# Patient Record
Sex: FEMALE | Race: WHITE | Employment: FULL TIME | URBAN - METROPOLITAN AREA
[De-identification: names, ages, dates, MRNs, and addresses within clinical notes are randomized per-mention and may not be internally consistent; named-entity substitution may affect disease eponyms.]

---

## 2020-05-27 ENCOUNTER — HOSPITAL ENCOUNTER (OUTPATIENT)
Age: 50
Discharge: HOME | End: 2020-05-27
Payer: COMMERCIAL

## 2020-05-27 LAB
BASOPHILS ABSOLUTE: 0 K/UL (ref 0–0.2)
BASOPHILS RELATIVE PERCENT: 0.6 %
EOSINOPHILS ABSOLUTE: 0.4 K/UL (ref 0–0.6)
EOSINOPHILS RELATIVE PERCENT: 9.6 %
HCT VFR BLD CALC: 43.2 % (ref 36–48)
HEMOGLOBIN: 14.8 G/DL (ref 12–16)
LYMPHOCYTES ABSOLUTE: 1.3 K/UL (ref 1–5.1)
LYMPHOCYTES RELATIVE PERCENT: 27.4 %
MCH RBC QN AUTO: 30.7 PG (ref 26–34)
MCHC RBC AUTO-ENTMCNC: 34.2 G/DL (ref 31–36)
MCV RBC AUTO: 89.8 FL (ref 80–100)
MONOCYTES ABSOLUTE: 0.3 K/UL (ref 0–1.3)
MONOCYTES RELATIVE PERCENT: 6.7 %
NEUTROPHILS ABSOLUTE: 2.6 K/UL (ref 1.7–7.7)
NEUTROPHILS RELATIVE PERCENT: 55.7 %
PDW BLD-RTO: 13.5 % (ref 12.4–15.4)
PLATELET # BLD: 307 K/UL (ref 135–450)
PMV BLD AUTO: 8.9 FL (ref 5–10.5)
RBC # BLD: 4.81 M/UL (ref 4–5.2)
WBC # BLD: 4.7 K/UL (ref 4–11)

## 2020-05-27 PROCEDURE — 85025 COMPLETE CBC W/AUTO DIFF WBC: CPT

## 2020-05-27 PROCEDURE — 82785 ASSAY OF IGE: CPT

## 2020-05-27 PROCEDURE — 36415 COLL VENOUS BLD VENIPUNCTURE: CPT

## 2020-05-29 LAB — IGE: 442 KU/L

## 2023-11-13 ENCOUNTER — HOSPITAL ENCOUNTER (OUTPATIENT)
Age: 53
Setting detail: SPECIMEN
Discharge: HOME | End: 2023-11-13

## 2023-11-27 ENCOUNTER — HOSPITAL ENCOUNTER (OUTPATIENT)
Dept: MRI IMAGING | Age: 53
Discharge: HOME | End: 2023-11-27
Payer: COMMERCIAL

## 2023-11-27 DIAGNOSIS — R92.8 ABNORMAL MAMMOGRAM: ICD-10-CM

## 2023-11-27 PROCEDURE — 6360000004 HC RX CONTRAST MEDICATION: Performed by: SURGERY

## 2023-11-27 PROCEDURE — C8908 MRI W/O FOL W/CONT, BREAST,: HCPCS

## 2023-11-27 PROCEDURE — A9579 GAD-BASE MR CONTRAST NOS,1ML: HCPCS | Performed by: SURGERY

## 2023-11-27 RX ADMIN — GADOTERIDOL 20 ML: 279.3 INJECTION, SOLUTION INTRAVENOUS at 13:38

## 2023-12-01 ENCOUNTER — HOSPITAL ENCOUNTER (OUTPATIENT)
Dept: WOMENS IMAGING | Age: 53
Discharge: HOME | End: 2023-12-01
Payer: COMMERCIAL

## 2023-12-01 DIAGNOSIS — R92.8 ABNORMAL MAMMOGRAM: ICD-10-CM

## 2023-12-01 DIAGNOSIS — R92.8 ABNORMAL FINDING ON BREAST IMAGING: ICD-10-CM

## 2023-12-01 PROCEDURE — 77065 DX MAMMO INCL CAD UNI: CPT

## 2023-12-01 PROCEDURE — 76642 ULTRASOUND BREAST LIMITED: CPT

## 2023-12-01 PROCEDURE — 2709999900 US BREAST BIOPSY W LOC DEVICE 1ST LESION LEFT

## 2023-12-05 ENCOUNTER — TELEPHONE (OUTPATIENT)
Dept: WOMENS IMAGING | Age: 53
End: 2023-12-05

## 2023-12-05 NOTE — TELEPHONE ENCOUNTER
Pathology for breast biopsy complete, radiologist confirms concordance. Reviewed breast biopsy results with patient and answered all questions. The patient is following up with her breast surgeon, Dr. Eyal Sales. Breast Navigator will remain available for any questions. Pt verbalized understanding.       Elise Hager RN

## 2023-12-06 ENCOUNTER — OFFICE VISIT (OUTPATIENT)
Dept: SURGERY | Age: 53
End: 2023-12-06
Payer: COMMERCIAL

## 2023-12-06 VITALS
BODY MASS INDEX: 36.14 KG/M2 | RESPIRATION RATE: 18 BRPM | HEART RATE: 110 BPM | DIASTOLIC BLOOD PRESSURE: 83 MMHG | TEMPERATURE: 98.1 F | WEIGHT: 204 LBS | OXYGEN SATURATION: 98 % | SYSTOLIC BLOOD PRESSURE: 148 MMHG

## 2023-12-06 DIAGNOSIS — C50.912 MALIGNANT NEOPLASM OF LEFT FEMALE BREAST, UNSPECIFIED ESTROGEN RECEPTOR STATUS, UNSPECIFIED SITE OF BREAST (HCC): Primary | ICD-10-CM

## 2023-12-06 DIAGNOSIS — Z72.0 NICOTINE ABUSE: ICD-10-CM

## 2023-12-06 PROCEDURE — 99205 OFFICE O/P NEW HI 60 MIN: CPT | Performed by: SURGERY

## 2023-12-06 RX ORDER — NABUMETONE 750 MG/1
750 TABLET, FILM COATED ORAL 2 TIMES DAILY
COMMUNITY

## 2023-12-29 ENCOUNTER — TELEPHONE (OUTPATIENT)
Dept: SURGERY | Age: 53
End: 2023-12-29

## 2023-12-29 NOTE — TELEPHONE ENCOUNTER
The patient was in the office to see Dr. Weinberg 12-6-2023.     PLAN: Would benefit from immediate oncoplastic reduction. Will obtain nicotine test in 4 weeks and if satisfactory, will schedule with Dr. Law.     The patient will take her nicotine test 1-8-2024.    Please send surgery letter.     I will route to MD.

## 2024-01-04 NOTE — TELEPHONE ENCOUNTER
I received a surgery letter from Dr. Weinberg.    I spoke with Deidre RENNER at [facility] ([phone]) to see if CPT Code 47153, 50934 or 38215 require pre certification. CPT Code 15951 and 23011 do require pre certification and for CPT Code 00980 pre determination is recommended, which I started during our call. I will fax clinicals to [phone]. I will scan the information that I faxed and the fax success into Epic under the media tab.     Pending Case # [id number]    Date Range 1- to 4-    Time HELD 1- 7:30 am.     The patient will have her nicotine test on 1-8-2024.    Please correct the surgery letter to correct the procedure name.       I will route to MD.

## 2024-01-05 NOTE — TELEPHONE ENCOUNTER
I received an updated surgery letter from Dr. Weinberg.     I spoke with Linda SANTIAGO at [place] ([phone]) to see if I can change the CPT Codes on  Pending Case # [id number]. The CPT Codes were changed to 02623 and 51286 with the original diagnosis code of C50.912.     Call Reference # [id number]    I will leave this phone note open.

## 2024-01-08 DIAGNOSIS — Z72.0 NICOTINE ABUSE: ICD-10-CM

## 2024-01-08 DIAGNOSIS — C50.912 MALIGNANT NEOPLASM OF LEFT FEMALE BREAST, UNSPECIFIED ESTROGEN RECEPTOR STATUS, UNSPECIFIED SITE OF BREAST (HCC): ICD-10-CM

## 2024-01-11 ENCOUNTER — HOSPITAL ENCOUNTER (OUTPATIENT)
Age: 54
Setting detail: SPECIMEN
Discharge: HOME | End: 2024-01-11

## 2024-01-11 LAB
Lab: NORMAL
REPORT: NORMAL
THIS TEST SENT TO: NORMAL

## 2024-01-12 ENCOUNTER — TELEPHONE (OUTPATIENT)
Dept: SURGERY | Age: 54
End: 2024-01-12

## 2024-01-12 LAB
COTININE SERPL-MCNC: <5 NG/ML
NICOTINE SERPL-MCNC: <5 NG/ML

## 2024-01-12 NOTE — TELEPHONE ENCOUNTER
I returned the patients call mentioned below. She is aware that as of today I have not received her nicotine result and that it normally takes 4-5 days for us to receive the result.     The patient had her H&P, lab work and EKG at [facility]. I can see the information in Care Everywhere.     I will close this phone note.

## 2024-01-12 NOTE — TELEPHONE ENCOUNTER
Patient called wanting to know if her nicotine lab results had been received yet, informed pt I did not see them in her chart and that clinical team would call her when they receive them. She is requesting call back wanting to know why they are not yet received, she is nervous because she has surgery scheduled next week

## 2024-01-15 NOTE — PROGRESS NOTES
[facility] PRE-SURGICAL TESTING INSTRUCTIONS                      PRIOR TO PROCEDURE DATE:    1. PLEASE FOLLOW ANY INSTRUCTIONS GIVEN TO YOU PER YOUR SURGEON.      2. Arrange for someone to drive you home and be with you for the first 24 hours after discharge for your safety after your procedure for which you received sedation. Ensure it is someone we can share information with regarding your discharge.     NOTE: At this time ONLY 2 ADULTS may accompany you   One person ENCOURAGED to stay at hospital entire time if outpatient surgery      3. You must contact your surgeon for instructions IF:  You are taking any blood thinners, aspirin, anti-inflammatory or vitamins.  There is a change in your physical condition such as a cold, fever, rash, cuts, sores, or any other infection, especially near your surgical site.    4. Do not drink alcohol the day before or day of your procedure.  Do not use any recreational marijuana at least 24 hours or street drugs (heroin, cocaine) at minimum 5 days prior to your procedure.     5. A Pre-Surgical History and Physical MUST be completed WITHIN 30 DAYS OR LESS prior to your procedure.by your Physician or an Urgent Care        THE DAY OF YOUR PROCEDURE:  1.  Follow instructions for ARRIVAL TIME as DIRECTED BY YOUR SURGEON.     2. Enter the MAIN entrance from [facility] and follow the signs to the free Parking Garage or  Parking (offered free of charge 7 am-5pm).      3. Enter the Main Entrance of the hospital (do not enter from the lower level of the parking garage). Upon entrance, check in with the  at the surgical information desk on your LEFT.   Bring your insurance card and photo ID to register      4. DO NOT EAT ANYTHING 8 hours prior to arrival for surgery.  You may have up to 8 ounces of water 4 hours prior to your arrival for surgery.   NOTE: ALL Gastric, Bariatric & Bowel surgery patients - you MUST follow your surgeon's instructions regarding

## 2024-01-15 NOTE — TELEPHONE ENCOUNTER
Per Dr. Weinberg : Negative nicotine - good to schedule.     Thanks!  NK    I spoke with the patient at the home number listed. The patient is now scheduled for surgery with  on 1-.     The patient had her H&P 1-5-2024 with [facility].     The patient is scheduled for her post op appointment     I will submit the surgery letter to [facility] today.     I will mail the surgery information and instructions to the patient today.    I will leave this phone note open until I hear back from Sha.

## 2024-01-16 ENCOUNTER — HOSPITAL ENCOUNTER (OUTPATIENT)
Dept: MAMMOGRAPHY | Age: 54
Discharge: HOME | End: 2024-01-16
Payer: COMMERCIAL

## 2024-01-16 ENCOUNTER — HOSPITAL ENCOUNTER (OUTPATIENT)
Dept: ULTRASOUND IMAGING | Age: 54
Discharge: HOME | End: 2024-01-16
Payer: COMMERCIAL

## 2024-01-16 DIAGNOSIS — R92.8 ABNORMAL MAMMOGRAM: ICD-10-CM

## 2024-01-16 PROCEDURE — 2709999900 US PLACE BREAST LOC DEVICE EACH ADDL LEFT

## 2024-01-16 PROCEDURE — 19285 PERQ DEV BREAST 1ST US IMAG: CPT

## 2024-01-16 PROCEDURE — 77065 DX MAMMO INCL CAD UNI: CPT

## 2024-01-17 ENCOUNTER — OFFICE VISIT (OUTPATIENT)
Dept: SURGERY | Age: 54
End: 2024-01-17
Payer: COMMERCIAL

## 2024-01-17 DIAGNOSIS — C50.912 MALIGNANT NEOPLASM OF LEFT FEMALE BREAST, UNSPECIFIED ESTROGEN RECEPTOR STATUS, UNSPECIFIED SITE OF BREAST (HCC): Primary | ICD-10-CM

## 2024-01-17 PROCEDURE — 99213 OFFICE O/P EST LOW 20 MIN: CPT

## 2024-01-17 NOTE — PROGRESS NOTES
[facility]    CC: Breast CA     Referring physician: Janna Law MD    HPI: This is a 53 y.o. female with a PMHx as delineated below who presents in consultation for breast reconstruction. She was found to have left breast cancer and desires to proceed with breast conservation therapy (two separate areas) with immediate oncoplastic reconstruction.  Plastic surgery was consulted for evaluation and treatment.The specifics of her breast cancer workup / treatment is as follows:     The patient presents today for education prior to surgical intervention. We have reviewed the surgical plan. The patient is scheduled for left oncoplastic breast reconstruction with matching right reduction mammaplasty. She will need adjuvant radiation as well. Patient did obtain nicotine testing which was negative. She denies any changes to her history since her last evaluation with our office.     Diagnosis: invasive lobular  Mo/Yr Diagnosed:   Breast Involved:Left  Stage: 1A  Addison status: Negative  ER: Positive DE: Positive HER2: Negative  Radiation: YES (WILL NEED ADJUVANT RADIATION)    Chemo/Meds: None  Pregnancy/Miscarriages:     PMHx:   Past Medical History:   Diagnosis Date    ADHD (attention deficit hyperactivity disorder)     Anxiety     Arthritis     Asthma     Depression     Lumbago      PSHx:   Past Surgical History:   Procedure Laterality Date     SECTION      SINUS SURGERY      US BREAST BIOPSY W LOC DEVICE 1ST LESION LEFT Left 2023    US BREAST BIOPSY W LOC DEVICE 1ST LESION LEFT 2023 Rob Mendoza MD [facility]    US GUIDED NEEDLE LOC BREAST ADDL LEFT Left 2024    US GUIDED NEEDLE LOC BREAST ADDL LEFT 2024 [facility]    US GUIDED NEEDLE LOC OF LEFT BREAST Left 2024    US GUIDED NEEDLE LOC OF LEFT BREAST 2024 [facility]     Allergies:   Allergies   Allergen Reactions    Gabapentin      FULL BODY SWELLING

## 2024-01-18 ENCOUNTER — ANESTHESIA EVENT (OUTPATIENT)
Dept: OPERATING ROOM | Age: 54
End: 2024-01-18
Payer: COMMERCIAL

## 2024-01-18 NOTE — TELEPHONE ENCOUNTER
I received a fax from [employer] dated 1-. I will scan the fax into Epic under the media tab.     APPROVED  Reference Number [id number]  CPT Code 45149, 56480, 29256  Date Range 1- to 1-    I will close this phone note.

## 2024-01-18 NOTE — TELEPHONE ENCOUNTER
I spoke with Mateus VELA at [facility] ([phone]) to follow up on Pending Case # [id number]. PENDING. I explained that the case is scheduled for tomorrow (1-) and that the case will be canceled without an APPROVAL today. The rep will send an email to the  to ask for a response ASAP.     Call Reference #  [id number]     I will leave this phone note open.

## 2024-01-19 ENCOUNTER — ANESTHESIA (OUTPATIENT)
Dept: OPERATING ROOM | Age: 54
End: 2024-01-19
Payer: COMMERCIAL

## 2024-01-19 ENCOUNTER — APPOINTMENT (OUTPATIENT)
Dept: MAMMOGRAPHY | Age: 54
End: 2024-01-19
Attending: SURGERY
Payer: COMMERCIAL

## 2024-01-19 ENCOUNTER — HOSPITAL ENCOUNTER (OUTPATIENT)
Age: 54
Setting detail: OUTPATIENT SURGERY
Discharge: HOME | End: 2024-01-19
Attending: SURGERY | Admitting: SURGERY
Payer: COMMERCIAL

## 2024-01-19 ENCOUNTER — APPOINTMENT (OUTPATIENT)
Dept: NUCLEAR MEDICINE | Age: 54
End: 2024-01-19
Payer: COMMERCIAL

## 2024-01-19 VITALS
HEART RATE: 88 BPM | TEMPERATURE: 97.6 F | SYSTOLIC BLOOD PRESSURE: 129 MMHG | OXYGEN SATURATION: 95 % | RESPIRATION RATE: 16 BRPM | HEIGHT: 63 IN | DIASTOLIC BLOOD PRESSURE: 72 MMHG | WEIGHT: 205.8 LBS | BODY MASS INDEX: 36.46 KG/M2

## 2024-01-19 DIAGNOSIS — R93.89 ABNORMAL FINDING ON IMAGING: Primary | ICD-10-CM

## 2024-01-19 DIAGNOSIS — G89.18 POST-OP PAIN: Primary | ICD-10-CM

## 2024-01-19 DIAGNOSIS — G89.18 ACUTE POST-OPERATIVE PAIN: ICD-10-CM

## 2024-01-19 DIAGNOSIS — Z85.3 HISTORY OF LEFT BREAST CANCER: ICD-10-CM

## 2024-01-19 PROCEDURE — 2580000003 HC RX 258: Performed by: ANESTHESIOLOGY

## 2024-01-19 PROCEDURE — 6360000002 HC RX W HCPCS: Performed by: NURSE ANESTHETIST, CERTIFIED REGISTERED

## 2024-01-19 PROCEDURE — 3700000000 HC ANESTHESIA ATTENDED CARE: Performed by: SURGERY

## 2024-01-19 PROCEDURE — A4217 STERILE WATER/SALINE, 500 ML: HCPCS | Performed by: SURGERY

## 2024-01-19 PROCEDURE — 88342 IMHCHEM/IMCYTCHM 1ST ANTB: CPT

## 2024-01-19 PROCEDURE — 7100000011 HC PHASE II RECOVERY - ADDTL 15 MIN: Performed by: SURGERY

## 2024-01-19 PROCEDURE — 2709999900 HC NON-CHARGEABLE SUPPLY: Performed by: SURGERY

## 2024-01-19 PROCEDURE — C1729 CATH, DRAINAGE: HCPCS | Performed by: SURGERY

## 2024-01-19 PROCEDURE — 97605 NEG PRS WND THER DME<=50SQCM: CPT | Performed by: SURGERY

## 2024-01-19 PROCEDURE — 6370000000 HC RX 637 (ALT 250 FOR IP): Performed by: ANESTHESIOLOGY

## 2024-01-19 PROCEDURE — 2580000003 HC RX 258: Performed by: SURGERY

## 2024-01-19 PROCEDURE — 2500000003 HC RX 250 WO HCPCS: Performed by: NURSE ANESTHETIST, CERTIFIED REGISTERED

## 2024-01-19 PROCEDURE — 76098 X-RAY EXAM SURGICAL SPECIMEN: CPT

## 2024-01-19 PROCEDURE — 14301 TIS TRNFR ANY 30.1-60 SQ CM: CPT | Performed by: SURGERY

## 2024-01-19 PROCEDURE — 3600000014 HC SURGERY LEVEL 4 ADDTL 15MIN: Performed by: SURGERY

## 2024-01-19 PROCEDURE — 19318 BREAST REDUCTION: CPT | Performed by: SURGERY

## 2024-01-19 PROCEDURE — C1713 ANCHOR/SCREW BN/BN,TIS/BN: HCPCS | Performed by: SURGERY

## 2024-01-19 PROCEDURE — 3430000000 HC RX DIAGNOSTIC RADIOPHARMACEUTICAL: Performed by: SURGERY

## 2024-01-19 PROCEDURE — 3700000001 HC ADD 15 MINUTES (ANESTHESIA): Performed by: SURGERY

## 2024-01-19 PROCEDURE — 88305 TISSUE EXAM BY PATHOLOGIST: CPT

## 2024-01-19 PROCEDURE — 6360000002 HC RX W HCPCS: Performed by: SURGERY

## 2024-01-19 PROCEDURE — 6360000002 HC RX W HCPCS: Performed by: ANESTHESIOLOGY

## 2024-01-19 PROCEDURE — 3600000004 HC SURGERY LEVEL 4 BASE: Performed by: SURGERY

## 2024-01-19 PROCEDURE — 2720000010 HC SURG SUPPLY STERILE: Performed by: SURGERY

## 2024-01-19 PROCEDURE — 14302 TIS TRNFR ADDL 30 SQ CM: CPT | Performed by: SURGERY

## 2024-01-19 PROCEDURE — 7100000010 HC PHASE II RECOVERY - FIRST 15 MIN: Performed by: SURGERY

## 2024-01-19 PROCEDURE — A9520 TC99 TILMANOCEPT DIAG 0.5MCI: HCPCS | Performed by: SURGERY

## 2024-01-19 PROCEDURE — 78195 LYMPH SYSTEM IMAGING: CPT

## 2024-01-19 PROCEDURE — 6370000000 HC RX 637 (ALT 250 FOR IP): Performed by: SURGERY

## 2024-01-19 PROCEDURE — 7100000000 HC PACU RECOVERY - FIRST 15 MIN: Performed by: SURGERY

## 2024-01-19 PROCEDURE — 88307 TISSUE EXAM BY PATHOLOGIST: CPT

## 2024-01-19 PROCEDURE — 7100000001 HC PACU RECOVERY - ADDTL 15 MIN: Performed by: SURGERY

## 2024-01-19 PROCEDURE — 2580000003 HC RX 258: Performed by: NURSE ANESTHETIST, CERTIFIED REGISTERED

## 2024-01-19 RX ORDER — DIAZEPAM 5 MG/1
5 TABLET ORAL EVERY 12 HOURS PRN
Qty: 20 TABLET | Refills: 0 | Status: SHIPPED | OUTPATIENT
Start: 2024-01-19 | End: 2024-01-29

## 2024-01-19 RX ORDER — FENTANYL CITRATE 50 UG/ML
25 INJECTION, SOLUTION INTRAMUSCULAR; INTRAVENOUS EVERY 5 MIN PRN
Status: COMPLETED | OUTPATIENT
Start: 2024-01-19 | End: 2024-01-19

## 2024-01-19 RX ORDER — ONDANSETRON 2 MG/ML
INJECTION INTRAMUSCULAR; INTRAVENOUS PRN
Status: DISCONTINUED | OUTPATIENT
Start: 2024-01-19 | End: 2024-01-19 | Stop reason: SDUPTHER

## 2024-01-19 RX ORDER — PHENYLEPHRINE HYDROCHLORIDE 10 MG/ML
INJECTION INTRAVENOUS PRN
Status: DISCONTINUED | OUTPATIENT
Start: 2024-01-19 | End: 2024-01-19 | Stop reason: SDUPTHER

## 2024-01-19 RX ORDER — APREPITANT 40 MG/1
40 CAPSULE ORAL ONCE
Status: COMPLETED | OUTPATIENT
Start: 2024-01-19 | End: 2024-01-19

## 2024-01-19 RX ORDER — CEFAZOLIN SODIUM 1 G/3ML
INJECTION, POWDER, FOR SOLUTION INTRAMUSCULAR; INTRAVENOUS PRN
Status: DISCONTINUED | OUTPATIENT
Start: 2024-01-19 | End: 2024-01-19 | Stop reason: SDUPTHER

## 2024-01-19 RX ORDER — HYDROMORPHONE HYDROCHLORIDE 2 MG/ML
INJECTION, SOLUTION INTRAMUSCULAR; INTRAVENOUS; SUBCUTANEOUS PRN
Status: DISCONTINUED | OUTPATIENT
Start: 2024-01-19 | End: 2024-01-19 | Stop reason: SDUPTHER

## 2024-01-19 RX ORDER — KETAMINE HCL IN NACL, ISO-OSM 20 MG/2 ML
SYRINGE (ML) INJECTION PRN
Status: DISCONTINUED | OUTPATIENT
Start: 2024-01-19 | End: 2024-01-19 | Stop reason: SDUPTHER

## 2024-01-19 RX ORDER — MAGNESIUM HYDROXIDE 1200 MG/15ML
LIQUID ORAL CONTINUOUS PRN
Status: COMPLETED | OUTPATIENT
Start: 2024-01-19 | End: 2024-01-19

## 2024-01-19 RX ORDER — DIPHENHYDRAMINE HYDROCHLORIDE 50 MG/ML
12.5 INJECTION INTRAMUSCULAR; INTRAVENOUS
Status: DISCONTINUED | OUTPATIENT
Start: 2024-01-19 | End: 2024-01-19 | Stop reason: HOSPADM

## 2024-01-19 RX ORDER — LABETALOL HYDROCHLORIDE 5 MG/ML
10 INJECTION, SOLUTION INTRAVENOUS
Status: DISCONTINUED | OUTPATIENT
Start: 2024-01-19 | End: 2024-01-19 | Stop reason: HOSPADM

## 2024-01-19 RX ORDER — LORAZEPAM 2 MG/ML
0.5 INJECTION INTRAMUSCULAR
Status: DISCONTINUED | OUTPATIENT
Start: 2024-01-19 | End: 2024-01-19 | Stop reason: HOSPADM

## 2024-01-19 RX ORDER — DOCUSATE SODIUM 100 MG/1
100 CAPSULE, LIQUID FILLED ORAL 2 TIMES DAILY
Qty: 30 CAPSULE | Refills: 0 | Status: SHIPPED | OUTPATIENT
Start: 2024-01-19 | End: 2024-02-03

## 2024-01-19 RX ORDER — ACETAMINOPHEN 325 MG/1
650 TABLET ORAL
Status: DISCONTINUED | OUTPATIENT
Start: 2024-01-19 | End: 2024-01-19 | Stop reason: HOSPADM

## 2024-01-19 RX ORDER — HYDROCODONE BITARTRATE AND ACETAMINOPHEN 5; 325 MG/1; MG/1
1 TABLET ORAL
Status: COMPLETED | OUTPATIENT
Start: 2024-01-19 | End: 2024-01-19

## 2024-01-19 RX ORDER — FIBRINOGEN HUMAN, HUMAN THROMBIN 90; 500 [IU]/ML; [IU]/ML
SOLUTION TOPICAL PRN
Status: DISCONTINUED | OUTPATIENT
Start: 2024-01-19 | End: 2024-01-19 | Stop reason: ALTCHOICE

## 2024-01-19 RX ORDER — HYDROCODONE BITARTRATE AND ACETAMINOPHEN 5; 325 MG/1; MG/1
1 TABLET ORAL EVERY 6 HOURS PRN
Qty: 28 TABLET | Refills: 0 | Status: SHIPPED | OUTPATIENT
Start: 2024-01-19 | End: 2024-01-26

## 2024-01-19 RX ORDER — FENTANYL CITRATE 50 UG/ML
50 INJECTION, SOLUTION INTRAMUSCULAR; INTRAVENOUS EVERY 5 MIN PRN
Status: DISCONTINUED | OUTPATIENT
Start: 2024-01-19 | End: 2024-01-19 | Stop reason: HOSPADM

## 2024-01-19 RX ORDER — ISOSULFAN BLUE 50 MG/5ML
INJECTION, SOLUTION SUBCUTANEOUS PRN
Status: DISCONTINUED | OUTPATIENT
Start: 2024-01-19 | End: 2024-01-19 | Stop reason: ALTCHOICE

## 2024-01-19 RX ORDER — MEPERIDINE HYDROCHLORIDE 25 MG/ML
12.5 INJECTION INTRAMUSCULAR; INTRAVENOUS; SUBCUTANEOUS EVERY 5 MIN PRN
Status: DISCONTINUED | OUTPATIENT
Start: 2024-01-19 | End: 2024-01-19 | Stop reason: HOSPADM

## 2024-01-19 RX ORDER — GLYCOPYRROLATE 0.2 MG/ML
INJECTION INTRAMUSCULAR; INTRAVENOUS PRN
Status: DISCONTINUED | OUTPATIENT
Start: 2024-01-19 | End: 2024-01-19 | Stop reason: SDUPTHER

## 2024-01-19 RX ORDER — CEPHALEXIN 500 MG/1
500 CAPSULE ORAL 4 TIMES DAILY
Qty: 40 CAPSULE | Refills: 0 | Status: SHIPPED | OUTPATIENT
Start: 2024-01-19 | End: 2024-01-29

## 2024-01-19 RX ORDER — LIDOCAINE HYDROCHLORIDE 20 MG/ML
INJECTION, SOLUTION INTRAVENOUS PRN
Status: DISCONTINUED | OUTPATIENT
Start: 2024-01-19 | End: 2024-01-19 | Stop reason: SDUPTHER

## 2024-01-19 RX ORDER — IPRATROPIUM BROMIDE AND ALBUTEROL SULFATE 2.5; .5 MG/3ML; MG/3ML
1 SOLUTION RESPIRATORY (INHALATION)
Status: DISCONTINUED | OUTPATIENT
Start: 2024-01-19 | End: 2024-01-19 | Stop reason: HOSPADM

## 2024-01-19 RX ORDER — SCOLOPAMINE TRANSDERMAL SYSTEM 1 MG/1
1 PATCH, EXTENDED RELEASE TRANSDERMAL ONCE
Status: DISCONTINUED | OUTPATIENT
Start: 2024-01-19 | End: 2024-01-19 | Stop reason: HOSPADM

## 2024-01-19 RX ORDER — SODIUM CHLORIDE, SODIUM LACTATE, POTASSIUM CHLORIDE, CALCIUM CHLORIDE 600; 310; 30; 20 MG/100ML; MG/100ML; MG/100ML; MG/100ML
INJECTION, SOLUTION INTRAVENOUS CONTINUOUS
Status: DISCONTINUED | OUTPATIENT
Start: 2024-01-19 | End: 2024-01-19 | Stop reason: HOSPADM

## 2024-01-19 RX ORDER — SODIUM CHLORIDE 0.9 % (FLUSH) 0.9 %
5-40 SYRINGE (ML) INJECTION PRN
Status: DISCONTINUED | OUTPATIENT
Start: 2024-01-19 | End: 2024-01-19 | Stop reason: HOSPADM

## 2024-01-19 RX ORDER — HYDROMORPHONE HYDROCHLORIDE 1 MG/ML
0.5 INJECTION, SOLUTION INTRAMUSCULAR; INTRAVENOUS; SUBCUTANEOUS EVERY 5 MIN PRN
Status: DISCONTINUED | OUTPATIENT
Start: 2024-01-19 | End: 2024-01-19 | Stop reason: HOSPADM

## 2024-01-19 RX ORDER — METHOCARBAMOL 100 MG/ML
INJECTION, SOLUTION INTRAMUSCULAR; INTRAVENOUS PRN
Status: DISCONTINUED | OUTPATIENT
Start: 2024-01-19 | End: 2024-01-19 | Stop reason: SDUPTHER

## 2024-01-19 RX ORDER — ONDANSETRON 2 MG/ML
4 INJECTION INTRAMUSCULAR; INTRAVENOUS
Status: DISCONTINUED | OUTPATIENT
Start: 2024-01-19 | End: 2024-01-19 | Stop reason: HOSPADM

## 2024-01-19 RX ORDER — PROCHLORPERAZINE EDISYLATE 5 MG/ML
5 INJECTION INTRAMUSCULAR; INTRAVENOUS
Status: DISCONTINUED | OUTPATIENT
Start: 2024-01-19 | End: 2024-01-19 | Stop reason: HOSPADM

## 2024-01-19 RX ORDER — ROCURONIUM BROMIDE 10 MG/ML
INJECTION, SOLUTION INTRAVENOUS PRN
Status: DISCONTINUED | OUTPATIENT
Start: 2024-01-19 | End: 2024-01-19 | Stop reason: SDUPTHER

## 2024-01-19 RX ORDER — FAMOTIDINE 10 MG/ML
INJECTION, SOLUTION INTRAVENOUS PRN
Status: DISCONTINUED | OUTPATIENT
Start: 2024-01-19 | End: 2024-01-19 | Stop reason: SDUPTHER

## 2024-01-19 RX ORDER — SODIUM CHLORIDE 9 MG/ML
INJECTION, SOLUTION INTRAVENOUS PRN
Status: DISCONTINUED | OUTPATIENT
Start: 2024-01-19 | End: 2024-01-19 | Stop reason: HOSPADM

## 2024-01-19 RX ORDER — PROPOFOL 10 MG/ML
INJECTION, EMULSION INTRAVENOUS PRN
Status: DISCONTINUED | OUTPATIENT
Start: 2024-01-19 | End: 2024-01-19 | Stop reason: SDUPTHER

## 2024-01-19 RX ORDER — SODIUM CHLORIDE 0.9 % (FLUSH) 0.9 %
5-40 SYRINGE (ML) INJECTION EVERY 12 HOURS SCHEDULED
Status: DISCONTINUED | OUTPATIENT
Start: 2024-01-19 | End: 2024-01-19 | Stop reason: HOSPADM

## 2024-01-19 RX ADMIN — SODIUM CHLORIDE, POTASSIUM CHLORIDE, SODIUM LACTATE AND CALCIUM CHLORIDE: 600; 310; 30; 20 INJECTION, SOLUTION INTRAVENOUS at 09:13

## 2024-01-19 RX ADMIN — HYDROCODONE BITARTRATE AND ACETAMINOPHEN 1 TABLET: 5; 325 TABLET ORAL at 13:17

## 2024-01-19 RX ADMIN — Medication 20 MG: at 07:34

## 2024-01-19 RX ADMIN — FENTANYL CITRATE 25 MCG: 50 INJECTION INTRAMUSCULAR; INTRAVENOUS at 11:21

## 2024-01-19 RX ADMIN — ROCURONIUM BROMIDE 100 MG: 10 INJECTION, SOLUTION INTRAVENOUS at 07:35

## 2024-01-19 RX ADMIN — HYDROMORPHONE HYDROCHLORIDE 0.5 MG: 1 INJECTION, SOLUTION INTRAMUSCULAR; INTRAVENOUS; SUBCUTANEOUS at 11:10

## 2024-01-19 RX ADMIN — GLYCOPYRROLATE 0.2 MG: 0.2 INJECTION INTRAMUSCULAR; INTRAVENOUS at 09:11

## 2024-01-19 RX ADMIN — HYDROMORPHONE HYDROCHLORIDE 2 MG: 2 INJECTION, SOLUTION INTRAMUSCULAR; INTRAVENOUS; SUBCUTANEOUS at 07:34

## 2024-01-19 RX ADMIN — FENTANYL CITRATE 25 MCG: 50 INJECTION INTRAMUSCULAR; INTRAVENOUS at 11:15

## 2024-01-19 RX ADMIN — METHOCARBAMOL 1000 MG: 100 INJECTION, SOLUTION INTRAMUSCULAR; INTRAVENOUS at 07:47

## 2024-01-19 RX ADMIN — SODIUM CHLORIDE, POTASSIUM CHLORIDE, SODIUM LACTATE AND CALCIUM CHLORIDE: 600; 310; 30; 20 INJECTION, SOLUTION INTRAVENOUS at 06:57

## 2024-01-19 RX ADMIN — SODIUM CHLORIDE, POTASSIUM CHLORIDE, SODIUM LACTATE AND CALCIUM CHLORIDE: 600; 310; 30; 20 INJECTION, SOLUTION INTRAVENOUS at 10:22

## 2024-01-19 RX ADMIN — PROPOFOL 200 MG: 10 INJECTION, EMULSION INTRAVENOUS at 07:34

## 2024-01-19 RX ADMIN — FAMOTIDINE 20 MG: 10 INJECTION, SOLUTION INTRAVENOUS at 07:32

## 2024-01-19 RX ADMIN — ROCURONIUM BROMIDE 50 MG: 10 INJECTION, SOLUTION INTRAVENOUS at 08:48

## 2024-01-19 RX ADMIN — FENTANYL CITRATE 25 MCG: 50 INJECTION INTRAMUSCULAR; INTRAVENOUS at 11:26

## 2024-01-19 RX ADMIN — APREPITANT 40 MG: 40 CAPSULE ORAL at 06:56

## 2024-01-19 RX ADMIN — LIDOCAINE HYDROCHLORIDE 100 MG: 20 INJECTION, SOLUTION INTRAVENOUS at 07:34

## 2024-01-19 RX ADMIN — FENTANYL CITRATE 50 MCG: 50 INJECTION INTRAMUSCULAR; INTRAVENOUS at 12:05

## 2024-01-19 RX ADMIN — SUGAMMADEX 200 MG: 100 INJECTION, SOLUTION INTRAVENOUS at 09:24

## 2024-01-19 RX ADMIN — CEFAZOLIN SODIUM 2 G: 1 POWDER, FOR SOLUTION INTRAMUSCULAR; INTRAVENOUS at 07:48

## 2024-01-19 RX ADMIN — ONDANSETRON 8 MG: 2 INJECTION INTRAMUSCULAR; INTRAVENOUS at 07:32

## 2024-01-19 RX ADMIN — TRANEXAMIC ACID 1000 MG: 100 INJECTION, SOLUTION INTRAVENOUS at 07:53

## 2024-01-19 RX ADMIN — TILMANOCEPT 803 MICRO CURIE: KIT at 08:22

## 2024-01-19 RX ADMIN — FENTANYL CITRATE 25 MCG: 50 INJECTION INTRAMUSCULAR; INTRAVENOUS at 11:31

## 2024-01-19 RX ADMIN — PHENYLEPHRINE HYDROCHLORIDE 50 MCG: 10 INJECTION INTRAVENOUS at 07:46

## 2024-01-19 RX ADMIN — HYDROMORPHONE HYDROCHLORIDE 0.5 MG: 1 INJECTION, SOLUTION INTRAMUSCULAR; INTRAVENOUS; SUBCUTANEOUS at 11:01

## 2024-01-19 ASSESSMENT — PAIN DESCRIPTION - FREQUENCY
FREQUENCY: CONTINUOUS

## 2024-01-19 ASSESSMENT — PAIN DESCRIPTION - PAIN TYPE
TYPE: SURGICAL PAIN

## 2024-01-19 ASSESSMENT — PAIN DESCRIPTION - ORIENTATION
ORIENTATION: RIGHT;LEFT
ORIENTATION: RIGHT;LEFT;MID
ORIENTATION: LEFT;RIGHT
ORIENTATION: RIGHT;LEFT
ORIENTATION: RIGHT;LEFT

## 2024-01-19 ASSESSMENT — PAIN SCALES - GENERAL
PAINLEVEL_OUTOF10: 5
PAINLEVEL_OUTOF10: 6
PAINLEVEL_OUTOF10: 7
PAINLEVEL_OUTOF10: 6
PAINLEVEL_OUTOF10: 8
PAINLEVEL_OUTOF10: 10

## 2024-01-19 ASSESSMENT — PAIN - FUNCTIONAL ASSESSMENT
PAIN_FUNCTIONAL_ASSESSMENT: PREVENTS OR INTERFERES SOME ACTIVE ACTIVITIES AND ADLS
PAIN_FUNCTIONAL_ASSESSMENT: PREVENTS OR INTERFERES SOME ACTIVE ACTIVITIES AND ADLS
PAIN_FUNCTIONAL_ASSESSMENT: 0-10
PAIN_FUNCTIONAL_ASSESSMENT: 0-10
PAIN_FUNCTIONAL_ASSESSMENT: PREVENTS OR INTERFERES SOME ACTIVE ACTIVITIES AND ADLS

## 2024-01-19 ASSESSMENT — PAIN DESCRIPTION - DESCRIPTORS
DESCRIPTORS: ACHING
DESCRIPTORS: BURNING
DESCRIPTORS: ACHING
DESCRIPTORS: BURNING
DESCRIPTORS: BURNING
DESCRIPTORS: ACHING
DESCRIPTORS: ACHING;DISCOMFORT

## 2024-01-19 ASSESSMENT — PAIN DESCRIPTION - ONSET
ONSET: ON-GOING

## 2024-01-19 ASSESSMENT — PAIN DESCRIPTION - LOCATION
LOCATION: BREAST
LOCATION: BREAST;CHEST
LOCATION: BREAST

## 2024-01-19 NOTE — OP NOTE
Operative Note    Rupinder Yao  YOB: 1970  MRN: [id number]    PREOPERATIVE DIAGNOSIS  left breast cancer     POSTOPERATIVE DIAGNOSIS  left breast cancer    PROCEDURE  1. Injection of blue dye to the left breast  2. left bracketed radiofrequency identification tag localized partial mastectomy  3. left sentinel lymph node biopsy    ANESTHESIA  General anesthesia.    SURGEON  Janna Law MD     ASSISTANT  Resident: Ld Mckeon DO    ESTIMATED BLOOD LOSS  less than 50 ml    COMPLICATIONS  None apparent by completion of procedure    SPECIMENS REMOVED  1. left breast tissue which was marked with a short stitch on the superior margin and a long stitch on the lateral margin, skin anterior-inferior  2. left breast tissue, new superior-lateral margin which was marked with a stitch on the new true margin  3. left sentinel lymph nodes     FINDINGS  The left breast tissue was excised using radiofrequency identification tag localization.  Specimen radiograph demonstrated that the lesion and clip and tag were located within the specimen. The left sentinel lymph nodes were identified and were grossly normal.     POST-OP CONDITION  Stable    DISPOSITION  To recovery room    INDICATION FOR PROCEDURE  Rupinder Yao is a 53 y.o. woman who was found to have an abnormality in her left breast on imaging.  A core biopsy was performed and revealed an invasive left breast cancer in the upper outer breast at two sites. I felt that she was an acceptable candidate for attempted breast conservation for which she was highly motivated.  She presents today for that purpose as well as a sentinel lymph node biopsy for axillary staging. The indications for the planned procedure, along with the potential benefits and risks which include but are not limited to: anesthetic risk, bleeding, infection, wound complications, sensation changes, unappealing cosmetics, lymphedema, arm numbness, nerve injury, and the  An additional margin was obtained superior-laterally.  Attention was then turned to the wound.  Aggressive hemostasis was obtained with electrocautery.  The wound was irrigated with copious amounts of sterile saline.  Clips were placed in the lumpectomy cavity in the area where the tumor had been located.     Attention was then turned to the left axilla.  The clavipectoral fascia was then incised along the pectoralis muscle.  Upon entering the axilla, the gamma probe and blue dye were utilized to guide localization of the sentinel nodes. 2 blue and radioactive lymph node was identified and excised. Ex vivo counts were 3090 and 130. These were then passed off the field.  The residual gamma probe activity within the axillary region was minimal.  The axilla was then assessed for other blue channels/nodes and palpably abnormal lymph nodes. All blue nodes, non colored nodes extending from colored lymphatics, radioactive and palpably suspicious nodes were removed.  Attention was then turned to the wound.  Aggressive hemostasis was obtained with electrocautery.  The wound was irrigated with copious amounts of sterile saline.  The patient tolerated this portion of the procedure well and plastic surgery completed the reconstruction portion of the procedure.  Her family was notified of intraoperative findings.    Electronically signed by Janna Law MD on 1/19/24 at 9:34 AM EST    Oncologic Synopsis:    Operation performed with curative intent: Yes  Tracer(s) used to identify sentinel nodes in the upfront surgery (nonneoadjuvant) setting (select all that apply) : Dye and Radioactive tracer  Tracer(s) used to identify sentinel nodes in the neoadjuvant setting (select all that apply): Not Applicable  All nodes (colored or noncolored) present at the end of a dye-filled lymphatic channel were removed: Yes  All significantly radioactive nodes were removed: Yes  All palpably suspicious nodes were removed:

## 2024-01-19 NOTE — BRIEF OP NOTE
Brief Postoperative Note      Patient: Rupinder Yao  YOB: 1970  MRN: [id number]    Date of Procedure: 1/19/2024    Pre-Op Diagnosis Codes:     * History of left breast cancer [Z85.3]    Post-Op Diagnosis: Same       Procedure(s):  LEFT BRACKETED RADIOFREQUENCY IDENTIFCATION TAG LOCALIZED PARTIAL MASTECTOMY, LEFT SENTINEL LYMPH NODE BIOPSY,  LEFT ONCOPLASTIC BREAST RECONSTRUCTION, MATCHING RIGHT REDUCTION MAMMAPLASTY  .  .    Surgeon(s):  Lana Weinberg MD Kundu, Neilendu, MD Tremelling, Abigail, MD Tremelling, Abigail, MD    Assistant:  Surgical Assistant: Betty Mcgraw  Resident: Ld Mckeon DO    Anesthesia: General    Estimated Blood Loss (mL): less than 50     Complications: None    Specimens:   ID Type Source Tests Collected by Time Destination   A : RIGHT BREAST TISSUE Tissue Tissue SURGICAL PATHOLOGY Lana Weinberg MD 1/19/2024 0847    B : LEFT BREAST TISSUE Tissue Tissue SURGICAL PATHOLOGY Lana Weinberg MD 1/19/2024 0848    C : LEFT PARTIAL MASTECTOMY (Short stitch superior, long stitch lateral, skin anterior, inferior) Tissue Tissue SURGICAL PATHOLOGY Janna Law MD 1/19/2024 0917    D : LEFT BREAST TISSUE (new superior lateral margin, stitch at new margin) Tissue Tissue SURGICAL PATHOLOGY Janna Law MD 1/19/2024 0917    E : left axillary sentinel lymph node Tissue Tissue SURGICAL PATHOLOGY Janna Law MD 1/19/2024 0926        Implants:  * No implants in log *      Drains:   Closed/Suction Drain Inferior;Right Breast Bulb (Active)   Site Description Clean, dry & intact 01/19/24 1005   Dressing Status New dressing applied 01/19/24 1005       Closed/Suction Drain Inferior;Lateral;Left Breast Bulb (Active)   Dressing Status New dressing applied 01/19/24 1005   Drain Status To bulb suction 01/19/24 1005       Negative Pressure Wound Therapy Breast Left;Lower (Active)   Wound Type Surgical 01/19/24 1027   Unit Type PREVENA 01/19/24 1027   Dressing

## 2024-01-19 NOTE — PROGRESS NOTES
Patient admitted to PACU # 05 from OR at 1055 post   LEFT BRACKETED RADIOFREQUENCY IDENTIFCATION TAG LOCALIZED PARTIAL MASTECTOMY, LEFT SENTINEL LYMPH NODE BIOPSY,  LEFT ONCOPLASTIC BREAST RECONSTRUCTION, MATCHING RIGHT REDUCTION MAMMAPLASTY - Left   per Dr. Weinberg and Dr. Law.  Attached to PACU monitoring system and report received from anesthesia provider.  Patient was reported to be hemodynamically stable during procedure.  Pt arrived to pacu awake and c/o pain in bilateral breast. PRN pain medication given. Pt has surgical bra in place that is c/d/I. L and R CHRISTOPHER breast CHRISTOPHER in place with brown drainage. Pt has L and R prevena in place and is on. IVF infusing. Will continue to monitor.

## 2024-01-19 NOTE — ANESTHESIA PRE PROCEDURE
Allergies:    Allergies   Allergen Reactions   • Gabapentin      FULL BODY SWELLING   • Oxycodone Hives and Itching       Problem List:  There is no problem list on file for this patient.      Past Medical History:        Diagnosis Date   • ADHD (attention deficit hyperactivity disorder)    • Anxiety    • Arthritis    • Asthma    • Depression    • Lumbago        Past Surgical History:        Procedure Laterality Date   •  SECTION     • SINUS SURGERY     • US BREAST BIOPSY W LOC DEVICE 1ST LESION LEFT Left 2023    US BREAST BIOPSY W LOC DEVICE 1ST LESION LEFT 2023 Rob M[facility] MD [facility]   • US GUIDED NEEDLE LOC BREAST ADDL LEFT Left 2024    US GUIDED NEEDLE LOC BREAST ADDL LEFT 2024 [facility]   • US GUIDED NEEDLE LOC OF LEFT BREAST Left 2024    US GUIDED NEEDLE LOC OF LEFT BREAST 2024 [facility]       Social History:    Social History     Tobacco Use   • Smoking status: Former     Current packs/day: 0.25     Types: Cigarettes   • Smokeless tobacco: Former     Quit date: 2015   Substance Use Topics   • Alcohol use: Yes     Comment: socially                                Counseling given: Not Answered      Vital Signs (Current):   Vitals:    01/15/24 0926   Weight: 90.7 kg (200 lb)   Height: 1.6 m (5' 3\")                                              BP Readings from Last 3 Encounters:   23 (!) 148/83   16 137/67       NPO Status:                                                                                 BMI:   Wt Readings from Last 3 Encounters:   01/15/24 90.7 kg (200 lb)   23 92.5 kg (204 lb)   16 68 kg (150 lb)     Body mass index is 35.43 kg/m².    CBC:   Lab Results   Component Value Date/Time    WBC 4.7 2020 12:07 PM    RBC 4.81 2020 12:07 PM    HGB 14.8 2020 12:07 PM    HCT 43.2 2020 12:07 PM    MCV 89.8 2020 12:07 PM    RDW 13.5 2020 12:07 PM

## 2024-01-19 NOTE — H&P
Update History & Physical    The patient's History and Physical of January 5, 2024 was reviewed with the patient and I examined the patient. There was no change. The surgical site was confirmed by the patient and me.     Patient feeling at normal state of health. Feels nervous about surgery, but is ready to proceed.     Plan: The risks, benefits, expected outcome, and alternative to the recommended procedure have been discussed with the patient. Patient understands and wants to proceed with the procedure.     Electronically signed by Ld Mckeon DO on 1/19/2024 at 6:58 AM

## 2024-01-19 NOTE — OP NOTE
[facility]     OPERATIVE DICTATION    NAME: Rupinder Yao   MRN: [id number]  DATE: 1/19/2024     AGE: 53 y.o.    SURGEON: Lana Weinberg MD  ASSISTANT: Ld Mckeon (PGY3), Betty Bronson (SA)     BREAST SURGEON: Janna Law MD    PREOPERATIVE DIAGNOSIS: Left breast cancer, macromastia  POSTOPERATIVE DIAGNOSIS: Same     OPERATION: 1) Immediate left oncoplastic reconstruction (defect: 15 x 6 cm; flap: 11 x 6.6  = 162.6 cm^2)    2) Matching right reduction mammaplasty    3) Application of Tabatha incisional wound vacuum device    ANESTHESIA: General     ESTIMATED BLOOD LOSS: 75 mL    OPERATIVE INDICATIONS: This is a 53 y.o. female who underwent mastectomy for breast cancer with details listed in a separate operative dictation. Options of reconstruction were discussed with the patient and she opted for breast conservation therapy with immediate oncoplastic reconstruction with reduction. The plan of surgery, risks, benefits, alternatives, indications, limitations, possible complications, and future surgeries were discussed with the patient and she agreed to proceed.     OPERATIVE PROCEDURE: The patient was marked in the standing position in the preoperative holding area.  She was then brought to the operating room and placed in the supine position on the operating table. After satisfactory induction with general endotracheal anesthesia, the patient was then prepped and draped in the usual sterile fashion. The operation commenced via de-epithelializing along the inferior aspect of the breast to create an inferior pedicle.  A Brandt pattern was then scored using the previous markings.  Superior flaps were elevated on both sides. The case was then turned over to breast oncology and Dr. Law performed lumpectomy as well as sentinel lymph node biopsy as will be dictated in a separate operative dictation. Upon completion, I removed additional tissue from the medial aspect of

## 2024-01-19 NOTE — PROGRESS NOTES
Patient requested RX for anxiety. Called  but he stated \"no\" because he doesn't like to prescribe both a pain narcotic and valium RX. Stated if she thought she wanted RX for anxiety to f/u with PCP. Per chart she has hx of anxiety but doesn't take anything for it. Stated her PCP doesn't do much. Family called aware RX's will be ready in 20 minutes.

## 2024-01-19 NOTE — ANESTHESIA POSTPROCEDURE EVALUATION
Department of Anesthesiology  Postprocedure Note    Patient: Rupinder Yao  MRN: [id number]  YOB: 1970  Date of evaluation: 1/19/2024    Procedure Summary       Date: 01/19/24 Room / Location: [place] [address] / [facility]    Anesthesia Start: 0729 Anesthesia Stop: 1057    Procedures:       LEFT BRACKETED RADIOFREQUENCY IDENTIFCATION TAG LOCALIZED PARTIAL MASTECTOMY, LEFT SENTINEL LYMPH NODE BIOPSY,  LEFT ONCOPLASTIC BREAST RECONSTRUCTION, MATCHING RIGHT REDUCTION MAMMAPLASTY (Left: Breast)      . (Bilateral: Breast)      . (Bilateral: Breast) Diagnosis:       History of left breast cancer      (History of left breast cancer [Z85.3])    Surgeons: Lana Weinberg MD; Janna Law MD Responsible Provider: Olivia Trujillo DO    Anesthesia Type: general ASA Status: 2            Anesthesia Type: No value filed.    Jackson Phase I: Jackson Score: 10    Jackson Phase II:      Anesthesia Post Evaluation    Patient location during evaluation: PACU  Patient participation: complete - patient participated  Level of consciousness: awake and alert  Pain score: 5  Airway patency: patent  Nausea & Vomiting: no nausea and no vomiting  Cardiovascular status: blood pressure returned to baseline  Respiratory status: acceptable  Hydration status: euvolemic  Multimodal analgesia pain management approach  Pain management: adequate    No notable events documented.

## 2024-01-19 NOTE — FLOWSHEET NOTE
Pt c/o pain. Pt states pain is improving with fentanyl. Dr. Buitrago called and said can give 50 mcg x2. See order. Will continue to monitor.

## 2024-01-19 NOTE — PROGRESS NOTES
Patient called on the way home from hospital in increased pain. Would like valium sent to the pharmacy. We discussed alternating her pain medication and we will send small script into pharmacy as well.     Talia

## 2024-01-19 NOTE — PROGRESS NOTES
Ambulatory Surgery/Procedure Discharge Note    Vitals:    01/19/24 1319   BP: 129/72   Pulse: 88   Resp: 16   Temp: 97.6 °F (36.4 °C)   SpO2: 95%   Jackson score criteria for discharge met.     In: 2210 [P.O.:360; I.V.:1850]  Out: 300 [Urine:150; Drains:25]    Restroom use offered before discharge.  Yes    Pain assessment:  none  Pain Level: 5    Pt and S.O./family states \"ready to go home\". Pt alert and oriented x4. IV removed. Denies N/V. Pt reported pain 6/10. Norco administered per MAR. Pt reports minimal pain relief, but states would like to be discharged. Pt instructed to call office if pain is not able to be adequately controlled at home. Dressing C, D & I.   Pt tolerating po intake. Discharge instructions given to pt and wife with pt permission. Pt and wife verbalized understanding of all instructions. Left with all belongings, prescriptions, and discharge instructions.       Patient discharged to home/self care. Patient discharged via wheel chair by transporter to waiting family/S.O.       1/19/2024 2:15PM

## 2024-01-19 NOTE — DISCHARGE INSTRUCTIONS
Diet:   May resume regular diet    Wound Care:   You have been given a bra to assist in the support of your incisions. Please wear this consistently for 48 hours following surgery. Please continue wearing the bra until your follow up appointment with Dr. Weinberg     You have 2 CHRISTOPHER drains. Please record the output for these drains daily and bring the record to your follow up appointment.     You have a Prevena wound vacuum over your incision site. Please ensure that this stays to proper suction until your follow up. You should be sent home with a charging kit as well as an instruction manual for the device. If you have any trouble or questions about the kit please call  [phone]    Activity:   No heavy lifting greater than a milk jug, or 8 lbs until follow up.    Pain management:   For moderate to severe pain please take the Norco that you were prescribed. If you take narcotics, note that they may cause constipation. For constipation take Colace up to two times a day as needed. If stools become loose, you may reduce the amount of colace you are taking or stop taking all together. Take as little narcotic pain medication as you can tolerate. No driving while on narcotics.    For mild to moderate pain you may take over-the-counter Tylenol as directed on the packaging. Do not take more than 4000 mg acetaminophen (the active ingredient in tylenol) per day.     Please note, the Norco you have been prescribed has acetaminophen in it, 325mg per pill. Please ensure that if you are taking regular Tylenol and Norco in the same day the total amount of acetaminophen does not exceed 4000mg.      Bowel Regimen:   Opioid/Narcotic pain relievers have a common side effect of constipation; therefore, you have been provided with a prescription for a stool softener, Docusate (Colace).  These medications are intended to help prevent you from experiencing this very common side effect and also help to regulate your bowels after surgery.    If your stools become too loose and/or frequent, decrease the Colace to one pill one time each day. If your stools are still loose after this modification, stop taking this medication all together.    Antibiotic:  You have been prescribed Keflex (cephalexin). Please take this 4 times a day for 10 days in order to help protect your incision against infection in your post operative period.     Return if:   Call/ Return to ED for increased redness, worsening pain, drainage from wound, or fevers greater than 101.5 F.      Follow up with Dr. Weinberg in 1 week(s). Please call the office to make an appointment. [phone]  Follow up with Dr. Law. Please call the office to make an appointment. [phone]         [facility] PLASTIC & RECONSTRUCTIVE SURGERY     Orlando Weinberg MD  [address] [place] [address] ([address])  [place] [address] [phone]     Post-op Instructions  ___________________________________________     Oncoplastic Breast Reduction     The following instructions will help you know what to expect in the days following your surgery. Do not, however, hesitate to call if you have questions or concerns.     Activities  - Sleep in a flexed position with your head and shoulders elevated. Keep pillows under your knees for the first few days. You can resume your normal sleeping position when comfortable. Also be careful not to sleep on the cord that connects the vac dressing to the home machine as this can cause pressure problems on your skin.    - Driving is prohibited for 1 to 2 weeks. Do not drive while taking pain medications.   - Avoid heavy lifting (no more than 5 pounds) and vigorous use of your arms for the first 3 weeks.   - Do not engage in sports, aerobics, or vacuuming.   - Start arm raising exercises gently within 1 week of surgery. This will be discussed at your follow-up appointment.      Diet  - Resume your preoperative diet as tolerated.      Pain Control/Medications  - You will receive a

## 2024-01-19 NOTE — PROGRESS NOTES
PACU Transfer to \[facility]\""    Vitals:    01/19/24 1245   BP: 122/77   Pulse: 85   Resp: 11   Temp: 97.7 °F (36.5 °C)   SpO2: 95%         Intake/Output Summary (Last 24 hours) at 1/19/2024 1313  Last data filed at 1/19/2024 1022  Gross per 24 hour   Intake 1850 ml   Output 275 ml   Net 1575 ml       Pain assessment:  receiving treatment  Pain Level: 5    Patient transferred to care of WOOD RN.    1/19/2024 1:13 PM

## 2024-01-23 DIAGNOSIS — G89.18 POST-OP PAIN: Primary | ICD-10-CM

## 2024-01-23 RX ORDER — DIAZEPAM 5 MG/1
5 TABLET ORAL EVERY 12 HOURS PRN
Qty: 15 TABLET | Refills: 0 | Status: SHIPPED | OUTPATIENT
Start: 2024-01-23 | End: 2024-02-02

## 2024-01-23 RX ORDER — HYDROCODONE BITARTRATE AND ACETAMINOPHEN 5; 325 MG/1; MG/1
1 TABLET ORAL EVERY 6 HOURS PRN
Qty: 10 TABLET | Refills: 0 | Status: SHIPPED | OUTPATIENT
Start: 2024-01-23 | End: 2024-01-26

## 2024-01-25 ENCOUNTER — OFFICE VISIT (OUTPATIENT)
Dept: SURGERY | Age: 54
End: 2024-01-25

## 2024-01-25 VITALS
OXYGEN SATURATION: 98 % | HEART RATE: 80 BPM | DIASTOLIC BLOOD PRESSURE: 86 MMHG | SYSTOLIC BLOOD PRESSURE: 138 MMHG | TEMPERATURE: 98.7 F | BODY MASS INDEX: 36.31 KG/M2 | WEIGHT: 205 LBS

## 2024-01-25 DIAGNOSIS — Z09 POSTOP CHECK: Primary | ICD-10-CM

## 2024-01-25 PROCEDURE — 99024 POSTOP FOLLOW-UP VISIT: CPT

## 2024-01-25 NOTE — PROGRESS NOTES
[facility]    PROCEDURE:  1) Immediate left oncoplastic reconstruction (defect: 15 x 6 cm; flap: 11 x 6.6  = 162.6 cm^2) 2) Matching right reduction mammaplasty 3) Application of Tabatha incisional wound vacuum device  DATE: 1/19/24    Rupinder Yao has been recovering satisfactorily since her procedure. Pain has been poorly controlled with the prescribed pain management regimen. We did send new norco refill yesterday to pharmacy.     EXAM    /86   Pulse 80   Temp 98.7 °F (37.1 °C)   Wt 93 kg (205 lb)   LMP 02/15/2021   SpO2 98%   BMI 36.31 kg/m²       GEN: NAD   BREAST: Wound vac in place and holding suction.     IMP: 53 y.o.female s/p Immediate left oncoplastic reconstruction with matching right reduction  PLAN: Bilateral drains removed. Will follow up next week for vac take down. She will call with any concerns otherwise.     Alina Little, APRN - CNP   [facility] Plastic & Reconstructive Surgery  [phone]  01/25/24

## 2024-02-01 ENCOUNTER — OFFICE VISIT (OUTPATIENT)
Dept: SURGERY | Age: 54
End: 2024-02-01

## 2024-02-01 VITALS
HEART RATE: 80 BPM | SYSTOLIC BLOOD PRESSURE: 136 MMHG | WEIGHT: 205 LBS | DIASTOLIC BLOOD PRESSURE: 82 MMHG | BODY MASS INDEX: 36.31 KG/M2 | TEMPERATURE: 98.1 F | OXYGEN SATURATION: 98 %

## 2024-02-01 DIAGNOSIS — Z09 POSTOP CHECK: Primary | ICD-10-CM

## 2024-02-01 PROCEDURE — 99024 POSTOP FOLLOW-UP VISIT: CPT

## 2024-02-02 NOTE — PROGRESS NOTES
[facility]    PROCEDURE:  1) Immediate left oncoplastic reconstruction (defect: 15 x 6 cm; flap: 11 x 6.6  = 162.6 cm^2) 2) Matching right reduction mammaplasty 3) Application of Tabatha incisional wound vacuum device  DATE: 1/19/24    Rupinder Yao has been recovering well since her last evaluation. Pain has been somewhat managed with prescribed pain medications. She presents today for wound vac take down.     EXAM    /82   Pulse 80   Temp 98.1 °F (36.7 °C)   Wt 93 kg (205 lb)   LMP 02/15/2021   SpO2 98%   BMI 36.31 kg/m²       GEN: NAD   BREAST: Incision site healing appropriately with exception of very minimal ecchymosis at t-intersection of left breast . No hematoma/seroma.      IMP: 53 y.o.female s/p Immediate left oncoplastic reconstruction with matching right reduction  PLAN: Wound vac removed. Will apply silvadene to left breast. She is happy thus far. She will follow up with Dr. Weinberg in 1 month to ensure continued wound healing. She will call with any concerns otherwise.     Alina Little, APRN - CNP   [facility] Plastic & Reconstructive Surgery  [phone]  02/02/24

## 2024-02-12 ENCOUNTER — TELEPHONE (OUTPATIENT)
Dept: SURGERY | Age: 54
End: 2024-02-12

## 2024-02-12 ENCOUNTER — OFFICE VISIT (OUTPATIENT)
Dept: SURGERY | Age: 54
End: 2024-02-12

## 2024-02-12 VITALS
OXYGEN SATURATION: 98 % | HEART RATE: 80 BPM | BODY MASS INDEX: 36.31 KG/M2 | DIASTOLIC BLOOD PRESSURE: 90 MMHG | SYSTOLIC BLOOD PRESSURE: 140 MMHG | WEIGHT: 205 LBS | TEMPERATURE: 98.6 F

## 2024-02-12 DIAGNOSIS — Z09 POSTOP CHECK: Primary | ICD-10-CM

## 2024-02-12 PROCEDURE — 99024 POSTOP FOLLOW-UP VISIT: CPT

## 2024-02-12 NOTE — PROGRESS NOTES
[facility]    PROCEDURE:  1) Immediate left oncoplastic reconstruction (defect: 15 x 6 cm; flap: 11 x 6.6  = 162.6 cm^2) 2) Matching right reduction mammaplasty 3) Application of Tabatha incisional wound vacuum device  DATE: 1/19/24    Rupinder Yao has been recovering well since her last evaluation. Pain has been somewhat managed with prescribed pain medications. She presents today for wound check. She states she has some concerns with left breast.     EXAM  BP (!) 140/90   Pulse 80   Temp 98.6 °F (37 °C)   Wt 93 kg (205 lb)   LMP 02/15/2021   SpO2 98%   BMI 36.31 kg/m²      GEN: NAD   BREAST: Incision site healing appropriately with exception of very minimal ecchymosis at t-intersection of left breast with very slight erythema surrounding area. No hematoma/seroma.      IMP: 54 y.o.female s/p Immediate left oncoplastic reconstruction with matching right reduction  PLAN: Will have patient do local wound care of Vashe, silvadene and then meenakshi daily to left breast. She will follow up with Dr. Weinberg in 1 month to ensure continued wound healing. She will call with any concerns otherwise.     Alina Little, APRN - CNP   [facility] Plastic & Reconstructive Surgery  [phone]  02/12/24

## 2024-02-12 NOTE — TELEPHONE ENCOUNTER
The patient called with c/o the T area being painful and more swollen. There is redness, heat in the area. There is are that is discolored from the gauze pushing up under her breast. No nausea, vomiting, or fever.    Please call : [phone]    The patient lora shave photos if needed.

## 2024-02-13 RX ORDER — SULFAMETHOXAZOLE AND TRIMETHOPRIM 800; 160 MG/1; MG/1
1 TABLET ORAL 2 TIMES DAILY
Qty: 20 TABLET | Refills: 0 | Status: SHIPPED | OUTPATIENT
Start: 2024-02-13 | End: 2024-02-23

## 2024-02-19 ENCOUNTER — OFFICE VISIT (OUTPATIENT)
Dept: SURGERY | Age: 54
End: 2024-02-19

## 2024-02-19 VITALS
SYSTOLIC BLOOD PRESSURE: 148 MMHG | BODY MASS INDEX: 36.31 KG/M2 | DIASTOLIC BLOOD PRESSURE: 88 MMHG | WEIGHT: 205 LBS | HEART RATE: 74 BPM | TEMPERATURE: 98.2 F | OXYGEN SATURATION: 98 %

## 2024-02-19 DIAGNOSIS — C50.912 MALIGNANT NEOPLASM OF LEFT FEMALE BREAST, UNSPECIFIED ESTROGEN RECEPTOR STATUS, UNSPECIFIED SITE OF BREAST (HCC): Primary | ICD-10-CM

## 2024-02-19 DIAGNOSIS — G89.18 POST-OPERATIVE PAIN: ICD-10-CM

## 2024-02-19 PROCEDURE — 99024 POSTOP FOLLOW-UP VISIT: CPT

## 2024-02-20 RX ORDER — DIAZEPAM 5 MG/1
5 TABLET ORAL EVERY 12 HOURS PRN
Qty: 10 TABLET | Refills: 0 | Status: SHIPPED | OUTPATIENT
Start: 2024-02-20 | End: 2024-03-01

## 2024-02-20 NOTE — PROGRESS NOTES
[facility]    PROCEDURE:  1) Immediate left oncoplastic reconstruction (defect: 15 x 6 cm; flap: 11 x 6.6  = 162.6 cm^2) 2) Matching right reduction mammaplasty 3) Application of Tabatha incisional wound vacuum device  DATE: 1/19/24    Rupinder Yao has been recovering well since her last evaluation. Pain has been somewhat managed with prescribed pain medications. She presents today for wound check. She is to meet with her radiation doctor next week to discuss radiation therapy.     EXAM  BP (!) 148/88   Pulse 74   Temp 98.2 °F (36.8 °C)   Wt 93 kg (205 lb)   LMP 02/15/2021   SpO2 98%   BMI 36.31 kg/m²      GEN: NAD   BREAST: Incision site healing with exception of wound dehiscence approx 5 cm x 5 cm with 0.5 depth. No hematoma/seroma.      IMP: 54 y.o.female s/p Immediate left oncoplastic reconstruction with matching right reduction  PLAN: Will have patient do local wound care of Vashe, silvadene and then meenakshi daily to left breast. Due to patient needing radiation therapy we will order wound vac to promote faster wound therapy. Wound culture taken today as well. She will optimize her protein to  grams per day. She is also requesting a few valium to help with wound discomfort, I will send a small script in for this. Once wound vac is in we will place wound vac. She will call with any concerns otherwise.     Alina Little, APRN - CNP   [facility] Plastic & Reconstructive Surgery  [phone]  2/19/24

## 2024-02-22 ENCOUNTER — TELEPHONE (OUTPATIENT)
Dept: SURGERY | Age: 54
End: 2024-02-22

## 2024-02-22 LAB
BACTERIA SPEC AEROBE CULT: ABNORMAL
BACTERIA SPEC AEROBE CULT: ABNORMAL
GRAM STN SPEC: ABNORMAL
ORGANISM: ABNORMAL

## 2024-02-22 RX ORDER — CIPROFLOXACIN 500 MG/1
500 TABLET, FILM COATED ORAL 2 TIMES DAILY
Qty: 14 TABLET | Refills: 0 | Status: SHIPPED | OUTPATIENT
Start: 2024-02-22 | End: 2024-02-29

## 2024-02-22 NOTE — TELEPHONE ENCOUNTER
----- Message from JAKE Dill CNP sent at 2/22/2024  4:22 PM EST -----  Enterobacter cloacae complex in wound culture. We will change atb to cipro, script sent to pharmacy.   Talia

## 2024-02-23 ENCOUNTER — TELEPHONE (OUTPATIENT)
Dept: SURGERY | Age: 54
End: 2024-02-23

## 2024-02-23 NOTE — TELEPHONE ENCOUNTER
Patient called in requesting to speak with the team about her FMLA paperwork    Please contact the patient at [phone]

## 2024-02-26 ENCOUNTER — OFFICE VISIT (OUTPATIENT)
Dept: SURGERY | Age: 54
End: 2024-02-26

## 2024-02-26 VITALS
HEART RATE: 103 BPM | WEIGHT: 209 LBS | OXYGEN SATURATION: 98 % | TEMPERATURE: 97.8 F | BODY MASS INDEX: 37.02 KG/M2 | DIASTOLIC BLOOD PRESSURE: 80 MMHG | SYSTOLIC BLOOD PRESSURE: 140 MMHG

## 2024-02-26 DIAGNOSIS — Z09 POSTOP CHECK: Primary | ICD-10-CM

## 2024-02-26 PROCEDURE — 99024 POSTOP FOLLOW-UP VISIT: CPT | Performed by: SURGERY

## 2024-02-26 NOTE — PROGRESS NOTES
[facility]    PROCEDURE: 1) Immediate left oncoplastic reconstruction (defect: 15 x 6 cm; flap: 11 x 6.6  = 162.6 cm^2)                          2) Matching right reduction mammaplasty                          3) Application of Tabatha incisional wound vacuum device  DATE: 1/19/24    Rupinder Yao has been recovering satisfactorily since her procedure. Pain has been well controlled without pain medications.     EXAM    BP (!) 140/80   Pulse (!) 103   Temp 97.8 °F (36.6 °C)   Wt 94.8 kg (209 lb)   LMP 02/15/2021   SpO2 98%   BMI 37.02 kg/m²      GEN: NAD   BREAST: Incisions healed well save for the central aspect of the left breast    IMP: 54 y.o.female s/p oncoplastic reconstruction with left epidermolysis  PLAN: Vac applied today. Will have replaced on Friday and then evaluate the wound to determine if she would benefit from STSG.     Orlando Weinberg MD  [facility] Plastic & Reconstructive Surgery  [phone]  02/26/24

## 2024-03-01 ENCOUNTER — OFFICE VISIT (OUTPATIENT)
Dept: SURGERY | Age: 54
End: 2024-03-01

## 2024-03-01 VITALS
TEMPERATURE: 97.6 F | SYSTOLIC BLOOD PRESSURE: 148 MMHG | WEIGHT: 209 LBS | BODY MASS INDEX: 37.02 KG/M2 | HEART RATE: 75 BPM | DIASTOLIC BLOOD PRESSURE: 96 MMHG | OXYGEN SATURATION: 98 %

## 2024-03-01 DIAGNOSIS — Z09 POSTOP CHECK: Primary | ICD-10-CM

## 2024-03-01 PROCEDURE — 99024 POSTOP FOLLOW-UP VISIT: CPT

## 2024-03-01 RX ORDER — CIPROFLOXACIN 500 MG/1
500 TABLET, FILM COATED ORAL 2 TIMES DAILY
Qty: 20 TABLET | Refills: 0 | Status: SHIPPED | OUTPATIENT
Start: 2024-03-01 | End: 2024-03-11

## 2024-03-01 NOTE — PROGRESS NOTES
[facility]    PROCEDURE: 1) Immediate left oncoplastic reconstruction (defect: 15 x 6 cm; flap: 11 x 6.6  = 162.6 cm^2)                          2) Matching right reduction mammaplasty                          3) Application of Tabatha incisional wound vacuum device  DATE: 1/19/24    Rupinder Yao has been recovering satisfactorily since her procedure. Pain has been well controlled without pain medications. Patient presents for wound vac change.     EXAM    BP (!) 148/96   Pulse 75   Temp 97.6 °F (36.4 °C)   Wt 94.8 kg (209 lb)   LMP 02/15/2021   SpO2 98%   BMI 37.02 kg/m²      GEN: NAD   BREAST: Incisions healed well save for the central aspect of the left breast    IMP: 54 y.o.female s/p oncoplastic reconstruction with left epidermolysis  PLAN: Vac applied today. Will have replaced on Monday in office. She will call with any concerns in the interim. The area did show improvement.     Alina Little, APRN-CNP  [facility] Plastic & Reconstructive Surgery  [phone]  03/01/24

## 2024-03-04 ENCOUNTER — OFFICE VISIT (OUTPATIENT)
Dept: SURGERY | Age: 54
End: 2024-03-04

## 2024-03-04 VITALS
WEIGHT: 213.2 LBS | SYSTOLIC BLOOD PRESSURE: 111 MMHG | BODY MASS INDEX: 37.77 KG/M2 | TEMPERATURE: 98.2 F | HEART RATE: 89 BPM | DIASTOLIC BLOOD PRESSURE: 71 MMHG | OXYGEN SATURATION: 96 % | RESPIRATION RATE: 16 BRPM

## 2024-03-04 DIAGNOSIS — Z09 POSTOP CHECK: Primary | ICD-10-CM

## 2024-03-04 PROCEDURE — 99024 POSTOP FOLLOW-UP VISIT: CPT

## 2024-03-04 NOTE — PROGRESS NOTES
[facility]    PROCEDURE: 1) Immediate left oncoplastic reconstruction (defect: 15 x 6 cm; flap: 11 x 6.6  = 162.6 cm^2) 2) Matching right reduction mammaplasty  3) Application of Tabatha incisional wound vacuum device  DATE: 1/19/24    Rupinder aYo has been recovering satisfactorily since her procedure. Pain has been well controlled without pain medications. Patient presents for wound vac change.     EXAM    BP (!) 148/96   Pulse 75   Temp 97.6 °F (36.4 °C)   Wt 94.8 kg (209 lb)   LMP 02/15/2021   SpO2 98%   BMI 37.02 kg/m²      GEN: NAD   BREAST: Incisions healed well save for the central aspect of the left breast    IMP: 54 y.o.female s/p oncoplastic reconstruction with left epidermolysis  PLAN: Vac applied today. Will have replaced on Friday in office. She will call with any concerns in the interim.    Alina Little, APRN-CNP  [facility] Plastic & Reconstructive Surgery  [phone]  3/4/24

## 2024-03-08 ENCOUNTER — OFFICE VISIT (OUTPATIENT)
Dept: SURGERY | Age: 54
End: 2024-03-08

## 2024-03-08 VITALS
WEIGHT: 213 LBS | TEMPERATURE: 97.8 F | HEART RATE: 75 BPM | OXYGEN SATURATION: 98 % | SYSTOLIC BLOOD PRESSURE: 115 MMHG | DIASTOLIC BLOOD PRESSURE: 70 MMHG | BODY MASS INDEX: 37.73 KG/M2

## 2024-03-08 DIAGNOSIS — Z09 POSTOP CHECK: Primary | ICD-10-CM

## 2024-03-08 PROCEDURE — 99024 POSTOP FOLLOW-UP VISIT: CPT

## 2024-03-08 NOTE — PROGRESS NOTES
[facility]    PROCEDURE: 1) Immediate left oncoplastic reconstruction (defect: 15 x 6 cm; flap: 11 x 6.6  = 162.6 cm^2) 2) Matching right reduction mammaplasty  3) Application of Tabatha incisional wound vacuum device  DATE: 1/19/24    Rupinder Yao has been recovering satisfactorily since her procedure. Pain has been well controlled without pain medications. Patient presents for wound vac change.     EXAM    /70   Pulse 75   Temp 97.8 °F (36.6 °C)   Wt 96.6 kg (213 lb)   LMP 02/15/2021   SpO2 98%   BMI 37.73 kg/m²      GEN: NAD   BREAST: Incisions healed well save for the central aspect of the left breast approx 5 cm x 5 cm with 0.5 cm depth    IMP: 54 y.o.female s/p oncoplastic reconstruction with left epidermolysis  PLAN: Vac applied today. Will have replaced on Monday in office. She will call with any concerns in the interim.    Alina Little, APRN-CNP  [facility] Plastic & Reconstructive Surgery  [phone]  3/8/24

## 2024-03-11 ENCOUNTER — OFFICE VISIT (OUTPATIENT)
Dept: SURGERY | Age: 54
End: 2024-03-11

## 2024-03-11 VITALS
SYSTOLIC BLOOD PRESSURE: 156 MMHG | DIASTOLIC BLOOD PRESSURE: 95 MMHG | TEMPERATURE: 98 F | WEIGHT: 207.4 LBS | OXYGEN SATURATION: 94 % | BODY MASS INDEX: 36.74 KG/M2 | RESPIRATION RATE: 16 BRPM | HEART RATE: 104 BPM

## 2024-03-11 DIAGNOSIS — Z09 POSTOP CHECK: Primary | ICD-10-CM

## 2024-03-11 LAB
Lab: NORMAL
REPORT: NORMAL
THIS TEST SENT TO: NORMAL

## 2024-03-11 PROCEDURE — 99024 POSTOP FOLLOW-UP VISIT: CPT

## 2024-03-11 NOTE — PROGRESS NOTES
[facility]    PROCEDURE: 1) Immediate left oncoplastic reconstruction (defect: 15 x 6 cm; flap: 11 x 6.6  = 162.6 cm^2) 2) Matching right reduction mammaplasty  3) Application of Tabatha incisional wound vacuum device  DATE: 1/19/24    Rupinder Yao has been recovering satisfactorily since her procedure. Pain has been well controlled without pain medications. Patient presents for wound vac change.     EXAM    BP (!) 156/95 (Site: Left Upper Arm, Position: Sitting, Cuff Size: Large Adult)   Pulse (!) 104   Temp 98 °F (36.7 °C) (Infrared)   Resp 16   Wt 94.1 kg (207 lb 6.4 oz)   LMP 02/15/2021   SpO2 94%   BMI 36.74 kg/m²      GEN: NAD   BREAST: Incisions healed well save for the central aspect of the left breast approx 3 cm x 3 cm with 0.5 cm depth    IMP: 54 y.o.female s/p oncoplastic reconstruction with left epidermolysis  PLAN: Vac applied today. Will have replaced on thursday in office. She will call with any concerns in the interim.    Alina Little, APRN-CNP  [facility] Plastic & Reconstructive Surgery  [phone]  3/11/24

## 2024-03-12 ENCOUNTER — PREP FOR PROCEDURE (OUTPATIENT)
Dept: SURGERY | Age: 54
End: 2024-03-12

## 2024-03-12 RX ORDER — SODIUM CHLORIDE 0.9 % (FLUSH) 0.9 %
5-40 SYRINGE (ML) INJECTION EVERY 12 HOURS SCHEDULED
Status: CANCELLED | OUTPATIENT
Start: 2024-03-22

## 2024-03-12 RX ORDER — SODIUM CHLORIDE 9 MG/ML
INJECTION, SOLUTION INTRAVENOUS PRN
Status: CANCELLED | OUTPATIENT
Start: 2024-03-22

## 2024-03-12 RX ORDER — SODIUM CHLORIDE, SODIUM LACTATE, POTASSIUM CHLORIDE, CALCIUM CHLORIDE 600; 310; 30; 20 MG/100ML; MG/100ML; MG/100ML; MG/100ML
INJECTION, SOLUTION INTRAVENOUS CONTINUOUS
Status: CANCELLED | OUTPATIENT
Start: 2024-03-22

## 2024-03-12 RX ORDER — SODIUM CHLORIDE 0.9 % (FLUSH) 0.9 %
5-40 SYRINGE (ML) INJECTION PRN
Status: CANCELLED | OUTPATIENT
Start: 2024-03-22

## 2024-03-14 ENCOUNTER — TELEPHONE (OUTPATIENT)
Dept: SURGERY | Age: 54
End: 2024-03-14

## 2024-03-14 ENCOUNTER — OFFICE VISIT (OUTPATIENT)
Dept: SURGERY | Age: 54
End: 2024-03-14

## 2024-03-14 VITALS
SYSTOLIC BLOOD PRESSURE: 141 MMHG | DIASTOLIC BLOOD PRESSURE: 92 MMHG | WEIGHT: 207 LBS | TEMPERATURE: 98.1 F | OXYGEN SATURATION: 98 % | HEART RATE: 84 BPM | BODY MASS INDEX: 36.67 KG/M2

## 2024-03-14 DIAGNOSIS — Z09 POSTOP CHECK: Primary | ICD-10-CM

## 2024-03-14 PROBLEM — S21.002A WOUND OF LEFT BREAST: Status: ACTIVE | Noted: 2024-03-12

## 2024-03-14 PROCEDURE — 99024 POSTOP FOLLOW-UP VISIT: CPT

## 2024-03-14 RX ORDER — PREGABALIN 200 MG/1
200 CAPSULE ORAL NIGHTLY
COMMUNITY
Start: 2024-03-08

## 2024-03-14 NOTE — PROGRESS NOTES
[facility]    PROCEDURE: 1) Immediate left oncoplastic reconstruction (defect: 15 x 6 cm; flap: 11 x 6.6  = 162.6 cm^2) 2) Matching right reduction mammaplasty  3) Application of Tabatha incisional wound vacuum device  DATE: 1/19/24    Rupinder Yao has been recovering satisfactorily since her procedure. Pain has been well controlled without pain medications. Patient presents for wound vac change.     EXAM    BP (!) 156/95 (Site: Left Upper Arm, Position: Sitting, Cuff Size: Large Adult)   Pulse (!) 104   Temp 98 °F (36.7 °C) (Infrared)   Resp 16   Wt 94.1 kg (207 lb 6.4 oz)   LMP 02/15/2021   SpO2 94%   BMI 36.74 kg/m²      GEN: NAD   BREAST: Incisions healed well save for the central aspect of the left breast approx 3 cm x 3 cm with 0.5 cm depth    IMP: 54 y.o.female s/p oncoplastic reconstruction with left epidermolysis  PLAN: Vac applied today. Will have replaced on Tuesday in office. We will take patient to the OR for STSG applied to left breast. She will call with any concerns in the interim.    R/B/A/O/P    JAKE Dill-CNP  [facility] Plastic & Reconstructive Surgery  [phone]  3/14/24

## 2024-03-14 NOTE — PROGRESS NOTES
[facility] PRE-SURGICAL TESTING INSTRUCTIONS                      PRIOR TO PROCEDURE DATE:    1. PLEASE FOLLOW ANY INSTRUCTIONS GIVEN TO YOU PER YOUR SURGEON.      2. Arrange for someone to drive you home and be with you for the first 24 hours after discharge for your safety after your procedure for which you received sedation. Ensure it is someone we can share information with regarding your discharge.     NOTE: At this time ONLY 2 ADULTS may accompany you   One person ENCOURAGED to stay at hospital entire time if outpatient surgery      3. You must contact your surgeon for instructions IF:  You are taking any blood thinners, aspirin, anti-inflammatory or vitamins.  There is a change in your physical condition such as a cold, fever, rash, cuts, sores, or any other infection, especially near your surgical site.    4. Do not drink alcohol the day before or day of your procedure.  Do not use any recreational marijuana at least 24 hours or street drugs (heroin, cocaine) at minimum 5 days prior to your procedure.     5. A Pre-Surgical History and Physical MUST be completed WITHIN 30 DAYS OR LESS prior to your procedure.by your Physician or an Urgent Care        THE DAY OF YOUR PROCEDURE:  1.  Follow instructions for ARRIVAL TIME as DIRECTED BY YOUR SURGEON.     2. Enter the MAIN entrance from [facility] and follow the signs to the free Parking Garage or  Parking (offered free of charge 7 am-5pm).      3. Enter the Main Entrance of the hospital (do not enter from the lower level of the parking garage). Upon entrance, check in with the  at the surgical information desk on your LEFT.   Bring your insurance card and photo ID to register      4. DO NOT EAT ANYTHING 8 hours prior to arrival for surgery.  You may have up to 8 ounces of water 4 hours prior to your arrival for surgery.   NOTE: ALL Gastric, Bariatric & Bowel surgery patients - you MUST follow your surgeon's instructions regarding  nurse will help you recover from any potential side effects of anesthesia, such as extreme drowsiness, changes in your vital signs or breathing patterns. Nausea, headache, muscle aches, or sore throat may also occur after anesthesia.  Your nurse will help you manage these potential side effects.    2. For comfort and safety, arrange to have someone at home with you for the first 24 hours after discharge.    3. You and your family will be given written instructions about your diet, activity, dressing care, medications, and return visits.     4. Once at home, should issues with nausea, pain, or bleeding occur, or should you notice any signs of infection, you should call your surgeon.    5. Always clean your hands before and after caring for your wound. Do not let your family touch your surgery site without cleaning their hands.     6. Narcotic pain medications can cause significant constipation.  You may want to add a stool softener to your postoperative medication schedule or speak to your surgeon on how best to manage this SIDE EFFECT.    SPECIAL INSTRUCTIONS Bring Albuterol Inhaler on DOS    Thank you for allowing us to care for you.  We strive to exceed your expectations in the delivery of care and service provided to you and your family.     If you need to contact the Pre-Admission Testing staff for any reason, please call us at [phone]    Instructions reviewed with patient during preadmission testing phone interview.  Opal Garcia RN.3/14/2024 .4:36 PM      ADDITIONAL EDUCATIONAL INFORMATION REVIEWED PER PHONE WITH YOU AND/OR YOUR FAMILY:  Yes Hibiclens® Bathing Instructions- Pt instructed to use per Surgeon's Instructions or 2-3 times prior to surgery   Yes Antibacterial Soap- pt instructed to use 2-3 time prior to surgery, if no instructions given for Hibiclens

## 2024-03-14 NOTE — TELEPHONE ENCOUNTER
I received a surgery letter from [facility]-.     I spoke with Linda SANTIAGO at [facility] ([phone]) to see if CPT Code 65259 require pre certification. The patients plan terminated 3-1-2024.     Call Reference # [id number]    I spoke with Mary RESENDIZ at [facility] ([phone]) to see if  CPT Code 07784 requires pre certification. No authorization is required and no pre determination is recommended.     Call Reference [id number]     I spoke with the patient at the home number listed. The patient is now scheduled for surgery with  on 3-.     The patient is aware of H&P.    The patient is scheduled for her post op appointment 4-1-2024.     Nicole gave the surgery information and instructions to the patient today during her visit with [facility].     I will submit the case request to [employer] today.     I will close this phone note.

## 2024-03-19 ENCOUNTER — OFFICE VISIT (OUTPATIENT)
Dept: SURGERY | Age: 54
End: 2024-03-19

## 2024-03-19 VITALS
BODY MASS INDEX: 35.02 KG/M2 | DIASTOLIC BLOOD PRESSURE: 90 MMHG | HEART RATE: 82 BPM | OXYGEN SATURATION: 98 % | TEMPERATURE: 98.2 F | SYSTOLIC BLOOD PRESSURE: 150 MMHG | WEIGHT: 204 LBS

## 2024-03-19 DIAGNOSIS — Z09 POSTOP CHECK: Primary | ICD-10-CM

## 2024-03-19 PROCEDURE — 99024 POSTOP FOLLOW-UP VISIT: CPT

## 2024-03-19 NOTE — PROGRESS NOTES
[facility]    PROCEDURE: 1) Immediate left oncoplastic reconstruction (defect: 15 x 6 cm; flap: 11 x 6.6  = 162.6 cm^2) 2) Matching right reduction mammaplasty  3) Application of Tabatha incisional wound vacuum device  DATE: 1/19/24    Rupinder Yao has been recovering satisfactorily since her procedure. Pain has been well controlled without pain medications. Patient presents for wound vac change.     EXAM    BP (!) 156/95 (Site: Left Upper Arm, Position: Sitting, Cuff Size: Large Adult)   Pulse (!) 104   Temp 98 °F (36.7 °C) (Infrared)   Resp 16   Wt 94.1 kg (207 lb 6.4 oz)   LMP 02/15/2021   SpO2 94%   BMI 36.74 kg/m²      GEN: NAD   BREAST: Incisions healed well save for the central aspect of the left breast approx 3 cm x 3 cm with 0.5 cm depth    IMP: 54 y.o.female s/p oncoplastic reconstruction with left epidermolysis  PLAN: Vac applied today. We will take patient to the OR for STSG applied to left breast. She will call with any concerns in the interim.    R/B/A/O/P    Alina Little, APRN-CNP  [facility] Plastic & Reconstructive Surgery  [phone]  3/19/24

## 2024-03-20 ENCOUNTER — TELEPHONE (OUTPATIENT)
Dept: SURGERY | Age: 54
End: 2024-03-20

## 2024-03-20 NOTE — TELEPHONE ENCOUNTER
Patient called requesting a call back once her [facility] paperwork is received, she states it was sent yesterday via fax.    Patient phone number [phone]

## 2024-03-22 ENCOUNTER — HOSPITAL ENCOUNTER (OUTPATIENT)
Age: 54
Setting detail: OUTPATIENT SURGERY
Discharge: HOME | End: 2024-03-22
Attending: SURGERY | Admitting: SURGERY
Payer: COMMERCIAL

## 2024-03-22 ENCOUNTER — ANESTHESIA (OUTPATIENT)
Dept: OPERATING ROOM | Age: 54
End: 2024-03-22
Payer: COMMERCIAL

## 2024-03-22 ENCOUNTER — ANESTHESIA EVENT (OUTPATIENT)
Dept: OPERATING ROOM | Age: 54
End: 2024-03-22
Payer: COMMERCIAL

## 2024-03-22 VITALS
HEIGHT: 64 IN | OXYGEN SATURATION: 96 % | SYSTOLIC BLOOD PRESSURE: 114 MMHG | HEART RATE: 92 BPM | RESPIRATION RATE: 14 BRPM | BODY MASS INDEX: 34.83 KG/M2 | TEMPERATURE: 97.8 F | DIASTOLIC BLOOD PRESSURE: 60 MMHG | WEIGHT: 204 LBS

## 2024-03-22 DIAGNOSIS — S21.002S WOUND OF LEFT BREAST, SEQUELA: Primary | ICD-10-CM

## 2024-03-22 PROCEDURE — A4217 STERILE WATER/SALINE, 500 ML: HCPCS | Performed by: SURGERY

## 2024-03-22 PROCEDURE — 2500000003 HC RX 250 WO HCPCS: Performed by: NURSE ANESTHETIST, CERTIFIED REGISTERED

## 2024-03-22 PROCEDURE — 2709999900 HC NON-CHARGEABLE SUPPLY: Performed by: SURGERY

## 2024-03-22 PROCEDURE — 97605 NEG PRS WND THER DME<=50SQCM: CPT | Performed by: SURGERY

## 2024-03-22 PROCEDURE — 7100000001 HC PACU RECOVERY - ADDTL 15 MIN: Performed by: SURGERY

## 2024-03-22 PROCEDURE — 2580000003 HC RX 258: Performed by: ANESTHESIOLOGY

## 2024-03-22 PROCEDURE — 3600000014 HC SURGERY LEVEL 4 ADDTL 15MIN: Performed by: SURGERY

## 2024-03-22 PROCEDURE — 6360000002 HC RX W HCPCS: Performed by: SURGERY

## 2024-03-22 PROCEDURE — 7100000010 HC PHASE II RECOVERY - FIRST 15 MIN: Performed by: SURGERY

## 2024-03-22 PROCEDURE — 3700000001 HC ADD 15 MINUTES (ANESTHESIA): Performed by: SURGERY

## 2024-03-22 PROCEDURE — 7100000000 HC PACU RECOVERY - FIRST 15 MIN: Performed by: SURGERY

## 2024-03-22 PROCEDURE — 2580000003 HC RX 258: Performed by: SURGERY

## 2024-03-22 PROCEDURE — 6360000002 HC RX W HCPCS: Performed by: ANESTHESIOLOGY

## 2024-03-22 PROCEDURE — 3700000000 HC ANESTHESIA ATTENDED CARE: Performed by: SURGERY

## 2024-03-22 PROCEDURE — 6370000000 HC RX 637 (ALT 250 FOR IP): Performed by: SURGERY

## 2024-03-22 PROCEDURE — 6360000002 HC RX W HCPCS: Performed by: NURSE ANESTHETIST, CERTIFIED REGISTERED

## 2024-03-22 PROCEDURE — 6370000000 HC RX 637 (ALT 250 FOR IP): Performed by: ANESTHESIOLOGY

## 2024-03-22 PROCEDURE — 6360000002 HC RX W HCPCS

## 2024-03-22 PROCEDURE — 15100 SPLT AGRFT T/A/L 1ST 100SQCM: CPT | Performed by: SURGERY

## 2024-03-22 PROCEDURE — 7100000011 HC PHASE II RECOVERY - ADDTL 15 MIN: Performed by: SURGERY

## 2024-03-22 PROCEDURE — 2580000003 HC RX 258

## 2024-03-22 PROCEDURE — C9290 INJ, BUPIVACAINE LIPOSOME: HCPCS | Performed by: SURGERY

## 2024-03-22 PROCEDURE — 3600000004 HC SURGERY LEVEL 4 BASE: Performed by: SURGERY

## 2024-03-22 RX ORDER — EPINEPHRINE NASAL SOLUTION 1 MG/ML
SOLUTION NASAL PRN
Status: DISCONTINUED | OUTPATIENT
Start: 2024-03-22 | End: 2024-03-22 | Stop reason: HOSPADM

## 2024-03-22 RX ORDER — FAMOTIDINE 10 MG/ML
INJECTION, SOLUTION INTRAVENOUS PRN
Status: DISCONTINUED | OUTPATIENT
Start: 2024-03-22 | End: 2024-03-22 | Stop reason: SDUPTHER

## 2024-03-22 RX ORDER — DIPHENHYDRAMINE HYDROCHLORIDE 50 MG/ML
25 INJECTION INTRAMUSCULAR; INTRAVENOUS EVERY 4 HOURS PRN
Status: DISCONTINUED | OUTPATIENT
Start: 2024-03-22 | End: 2024-03-22 | Stop reason: HOSPADM

## 2024-03-22 RX ORDER — ONDANSETRON 2 MG/ML
4 INJECTION INTRAMUSCULAR; INTRAVENOUS
Status: DISCONTINUED | OUTPATIENT
Start: 2024-03-22 | End: 2024-03-22 | Stop reason: HOSPADM

## 2024-03-22 RX ORDER — SODIUM CHLORIDE, SODIUM LACTATE, POTASSIUM CHLORIDE, CALCIUM CHLORIDE 600; 310; 30; 20 MG/100ML; MG/100ML; MG/100ML; MG/100ML
INJECTION, SOLUTION INTRAVENOUS CONTINUOUS
Status: DISCONTINUED | OUTPATIENT
Start: 2024-03-22 | End: 2024-03-22 | Stop reason: HOSPADM

## 2024-03-22 RX ORDER — KETAMINE HCL IN NACL, ISO-OSM 20 MG/2 ML
SYRINGE (ML) INJECTION PRN
Status: DISCONTINUED | OUTPATIENT
Start: 2024-03-22 | End: 2024-03-22 | Stop reason: SDUPTHER

## 2024-03-22 RX ORDER — SODIUM CHLORIDE 0.9 % (FLUSH) 0.9 %
5-40 SYRINGE (ML) INJECTION EVERY 12 HOURS SCHEDULED
Status: DISCONTINUED | OUTPATIENT
Start: 2024-03-22 | End: 2024-03-22 | Stop reason: HOSPADM

## 2024-03-22 RX ORDER — FENTANYL CITRATE 50 UG/ML
25 INJECTION, SOLUTION INTRAMUSCULAR; INTRAVENOUS EVERY 5 MIN PRN
Status: DISCONTINUED | OUTPATIENT
Start: 2024-03-22 | End: 2024-03-22 | Stop reason: HOSPADM

## 2024-03-22 RX ORDER — MAGNESIUM HYDROXIDE 1200 MG/15ML
LIQUID ORAL CONTINUOUS PRN
Status: DISCONTINUED | OUTPATIENT
Start: 2024-03-22 | End: 2024-03-22 | Stop reason: HOSPADM

## 2024-03-22 RX ORDER — HYDROMORPHONE HYDROCHLORIDE 1 MG/ML
0.5 INJECTION, SOLUTION INTRAMUSCULAR; INTRAVENOUS; SUBCUTANEOUS EVERY 5 MIN PRN
Status: DISCONTINUED | OUTPATIENT
Start: 2024-03-22 | End: 2024-03-22 | Stop reason: HOSPADM

## 2024-03-22 RX ORDER — LIDOCAINE HYDROCHLORIDE 20 MG/ML
INJECTION, SOLUTION INFILTRATION; PERINEURAL PRN
Status: DISCONTINUED | OUTPATIENT
Start: 2024-03-22 | End: 2024-03-22 | Stop reason: SDUPTHER

## 2024-03-22 RX ORDER — LABETALOL HYDROCHLORIDE 5 MG/ML
10 INJECTION, SOLUTION INTRAVENOUS
Status: DISCONTINUED | OUTPATIENT
Start: 2024-03-22 | End: 2024-03-22 | Stop reason: HOSPADM

## 2024-03-22 RX ORDER — NALOXONE HYDROCHLORIDE 0.4 MG/ML
INJECTION, SOLUTION INTRAMUSCULAR; INTRAVENOUS; SUBCUTANEOUS PRN
Status: DISCONTINUED | OUTPATIENT
Start: 2024-03-22 | End: 2024-03-22 | Stop reason: HOSPADM

## 2024-03-22 RX ORDER — TRAMADOL HYDROCHLORIDE 50 MG/1
50 TABLET ORAL EVERY 4 HOURS PRN
Qty: 30 TABLET | Refills: 0 | Status: SHIPPED | OUTPATIENT
Start: 2024-03-22 | End: 2024-03-27

## 2024-03-22 RX ORDER — SODIUM CHLORIDE 0.9 % (FLUSH) 0.9 %
5-40 SYRINGE (ML) INJECTION PRN
Status: DISCONTINUED | OUTPATIENT
Start: 2024-03-22 | End: 2024-03-22 | Stop reason: HOSPADM

## 2024-03-22 RX ORDER — PROCHLORPERAZINE EDISYLATE 5 MG/ML
5 INJECTION INTRAMUSCULAR; INTRAVENOUS
Status: DISCONTINUED | OUTPATIENT
Start: 2024-03-22 | End: 2024-03-22 | Stop reason: HOSPADM

## 2024-03-22 RX ORDER — SODIUM CHLORIDE 9 MG/ML
INJECTION, SOLUTION INTRAVENOUS PRN
Status: DISCONTINUED | OUTPATIENT
Start: 2024-03-22 | End: 2024-03-22 | Stop reason: HOSPADM

## 2024-03-22 RX ORDER — HYDROMORPHONE HYDROCHLORIDE 2 MG/ML
INJECTION, SOLUTION INTRAMUSCULAR; INTRAVENOUS; SUBCUTANEOUS PRN
Status: DISCONTINUED | OUTPATIENT
Start: 2024-03-22 | End: 2024-03-22 | Stop reason: SDUPTHER

## 2024-03-22 RX ORDER — ONDANSETRON 2 MG/ML
INJECTION INTRAMUSCULAR; INTRAVENOUS PRN
Status: DISCONTINUED | OUTPATIENT
Start: 2024-03-22 | End: 2024-03-22 | Stop reason: SDUPTHER

## 2024-03-22 RX ORDER — MINERAL OIL
OIL (ML) MISCELLANEOUS PRN
Status: DISCONTINUED | OUTPATIENT
Start: 2024-03-22 | End: 2024-03-22 | Stop reason: HOSPADM

## 2024-03-22 RX ORDER — PROPOFOL 10 MG/ML
INJECTION, EMULSION INTRAVENOUS PRN
Status: DISCONTINUED | OUTPATIENT
Start: 2024-03-22 | End: 2024-03-22 | Stop reason: SDUPTHER

## 2024-03-22 RX ORDER — DIPHENHYDRAMINE HYDROCHLORIDE 50 MG/ML
12.5 INJECTION INTRAMUSCULAR; INTRAVENOUS
Status: DISCONTINUED | OUTPATIENT
Start: 2024-03-22 | End: 2024-03-22 | Stop reason: HOSPADM

## 2024-03-22 RX ORDER — IPRATROPIUM BROMIDE AND ALBUTEROL SULFATE 2.5; .5 MG/3ML; MG/3ML
1 SOLUTION RESPIRATORY (INHALATION)
Status: DISCONTINUED | OUTPATIENT
Start: 2024-03-22 | End: 2024-03-22 | Stop reason: HOSPADM

## 2024-03-22 RX ORDER — LORAZEPAM 2 MG/ML
0.5 INJECTION INTRAMUSCULAR
Status: DISCONTINUED | OUTPATIENT
Start: 2024-03-22 | End: 2024-03-22 | Stop reason: HOSPADM

## 2024-03-22 RX ORDER — CEPHALEXIN 500 MG/1
500 CAPSULE ORAL 4 TIMES DAILY
Qty: 28 CAPSULE | Refills: 0 | Status: SHIPPED | OUTPATIENT
Start: 2024-03-22 | End: 2024-03-29

## 2024-03-22 RX ORDER — HYDROCODONE BITARTRATE AND ACETAMINOPHEN 5; 325 MG/1; MG/1
1 TABLET ORAL ONCE
Status: COMPLETED | OUTPATIENT
Start: 2024-03-22 | End: 2024-03-22

## 2024-03-22 RX ORDER — MEPERIDINE HYDROCHLORIDE 25 MG/ML
12.5 INJECTION INTRAMUSCULAR; INTRAVENOUS; SUBCUTANEOUS EVERY 5 MIN PRN
Status: DISCONTINUED | OUTPATIENT
Start: 2024-03-22 | End: 2024-03-22 | Stop reason: HOSPADM

## 2024-03-22 RX ORDER — APREPITANT 40 MG/1
40 CAPSULE ORAL ONCE
Status: COMPLETED | OUTPATIENT
Start: 2024-03-22 | End: 2024-03-22

## 2024-03-22 RX ORDER — SCOLOPAMINE TRANSDERMAL SYSTEM 1 MG/1
1 PATCH, EXTENDED RELEASE TRANSDERMAL ONCE
Status: DISCONTINUED | OUTPATIENT
Start: 2024-03-22 | End: 2024-03-22 | Stop reason: HOSPADM

## 2024-03-22 RX ADMIN — SODIUM CHLORIDE, SODIUM LACTATE, POTASSIUM CHLORIDE, AND CALCIUM CHLORIDE: .6; .31; .03; .02 INJECTION, SOLUTION INTRAVENOUS at 14:17

## 2024-03-22 RX ADMIN — FAMOTIDINE 20 MG: 10 INJECTION, SOLUTION INTRAVENOUS at 14:50

## 2024-03-22 RX ADMIN — ONDANSETRON 8 MG: 2 INJECTION INTRAMUSCULAR; INTRAVENOUS at 14:50

## 2024-03-22 RX ADMIN — HYDROCODONE BITARTRATE AND ACETAMINOPHEN 1 TABLET: 5; 325 TABLET ORAL at 15:51

## 2024-03-22 RX ADMIN — FENTANYL CITRATE 25 MCG: 50 INJECTION INTRAMUSCULAR; INTRAVENOUS at 15:27

## 2024-03-22 RX ADMIN — DIPHENHYDRAMINE HYDROCHLORIDE 25 MG: 50 INJECTION INTRAMUSCULAR; INTRAVENOUS at 13:58

## 2024-03-22 RX ADMIN — APREPITANT 40 MG: 40 CAPSULE ORAL at 13:56

## 2024-03-22 RX ADMIN — FENTANYL CITRATE 25 MCG: 50 INJECTION INTRAMUSCULAR; INTRAVENOUS at 15:43

## 2024-03-22 RX ADMIN — SODIUM CHLORIDE 2000 MG: 900 INJECTION INTRAVENOUS at 14:56

## 2024-03-22 RX ADMIN — PROPOFOL 250 MG: 10 INJECTION, EMULSION INTRAVENOUS at 14:52

## 2024-03-22 RX ADMIN — LIDOCAINE HYDROCHLORIDE 100 MG: 20 INJECTION, SOLUTION INFILTRATION; PERINEURAL at 14:52

## 2024-03-22 RX ADMIN — Medication 20 MG: at 14:52

## 2024-03-22 RX ADMIN — HYDROMORPHONE HYDROCHLORIDE 1.4 MG: 2 INJECTION, SOLUTION INTRAMUSCULAR; INTRAVENOUS; SUBCUTANEOUS at 14:52

## 2024-03-22 ASSESSMENT — PAIN - FUNCTIONAL ASSESSMENT
PAIN_FUNCTIONAL_ASSESSMENT: NONE - DENIES PAIN
PAIN_FUNCTIONAL_ASSESSMENT: PREVENTS OR INTERFERES SOME ACTIVE ACTIVITIES AND ADLS

## 2024-03-22 ASSESSMENT — PAIN SCALES - GENERAL
PAINLEVEL_OUTOF10: 6
PAINLEVEL_OUTOF10: 6
PAINLEVEL_OUTOF10: 5
PAINLEVEL_OUTOF10: 4
PAINLEVEL_OUTOF10: 6

## 2024-03-22 ASSESSMENT — PAIN DESCRIPTION - DESCRIPTORS: DESCRIPTORS: DISCOMFORT

## 2024-03-22 ASSESSMENT — PAIN DESCRIPTION - LOCATION: LOCATION: BREAST

## 2024-03-22 ASSESSMENT — PAIN DESCRIPTION - ORIENTATION: ORIENTATION: LEFT

## 2024-03-22 NOTE — FLOWSHEET NOTE
Procedure(s):  SPLIT THICKNESS SKIN GRAFT TO THE LEFT BREAST    No results for input(s): \"POCGLU\" in the last 72 hours.    Admitted to PACU bed 10 from OR. Arrived on a stretcher .  Attached to PACU monitoring system. Alarms and parameters set. Report received from anesthesia personnel and OR staff.OR staff did not report skin issues that were observed while in OR.     Dressing to right breast c/d/I. Negative pressure dressing in place - it was off upon arrival to PACU, Dr Weinberg notified and came to bedside and turned on device.     Dressing to left thigh with some redness and bleeding visible through Tegaderm.        03/22/24 1520   Vital Signs   Temp 97.8 °F (36.6 °C)   Temp Source Temporal   Pulse 98   Heart Rate Source Monitor   Respirations 13   /88   MAP (Calculated) 103   MAP (mmHg) 102   Patient Position Semi wlers   Pain Assessment   Pain Assessment 0-10   Pain Level 6   Patient's Stated Pain Goal 4   Opioid-Induced Sedation   POSS Score 1   Oxygen Therapy   SpO2 92 %   O2 Device None (Room air)   O2 Flow Rate (L/min) 0 L/min           Intake/Output Summary (Last 24 hours) at 3/22/2024 1529  Last data filed at 3/22/2024 1520  Gross per 24 hour   Intake 850 ml   Output --   Net 850 ml

## 2024-03-22 NOTE — ANESTHESIA POSTPROCEDURE EVALUATION
Department of Anesthesiology  Postprocedure Note    Patient: Rupinder Yao  MRN: [id number]  YOB: 1970  Date of evaluation: 3/22/2024    Procedure Summary       Date: 03/22/24 Room / Location: [address]    Anesthesia Start: 1448 Anesthesia Stop: 1520    Procedure: SPLIT THICKNESS SKIN GRAFT TO THE LEFT BREAST (Left) Diagnosis:       Wound of left breast, sequela      (Wound of left breast, sequela [S21.002S])    Surgeons: Lana Weinberg MD Responsible Provider: Jules Garcia MD    Anesthesia Type: Not recorded ASA Status: 2            Anesthesia Type: No value filed.    Jackson Phase I: Jackson Score: 9    Jackson Phase II:      Anesthesia Post Evaluation    Patient location during evaluation: PACU  Patient participation: complete - patient participated  Level of consciousness: awake  Airway patency: patent  Nausea & Vomiting: no nausea and no vomiting  Cardiovascular status: blood pressure returned to baseline and hemodynamically stable  Respiratory status: acceptable  Hydration status: euvolemic  Multimodal analgesia pain management approach  Pain management: adequate    No notable events documented.

## 2024-03-22 NOTE — FLOWSHEET NOTE
PACU Transfer to [facility]    Procedure(s):  SPLIT THICKNESS SKIN GRAFT TO THE LEFT BREAST    Pt meets criteria to transfer to next phase of care per RAFAEL SCORE and ASPAN standards       03/22/24 1615   Vital Signs   Temp 97.2 °F (36.2 °C)   Temp Source Temporal   Pulse 90   Respirations 14   BP (!) 116/50   MAP (Calculated) 72   MAP (mmHg) 68   Pain Assessment   Pain Assessment 0-10   Pain Level 4   Patient's Stated Pain Goal 4   Response to Pain Intervention Patient satisfied   Oxygen Therapy   SpO2 94 %   O2 Device None (Room air)           In: 1969 [P.O.:720; I.V.:1200]  Out: -         Pt taken to [facility] #6 via Stretcher by Registered Nurse GISELLA

## 2024-03-22 NOTE — OP NOTE
[facility] PLASTIC & RECONSTRUCTIVE SURGERY     OPERATIVE DICTATION    NAME: Rupinder Yao   MRN: [id number]  DATE: 3/22/2024     AGE: 54 y.o.    LOCATION: [facility]    PREOPERATIVE DIAGNOSIS:  Left breast wound     POSTOPERATIVE DIAGNOSIS:  Same.     OPERATION PERFORMED: 1) Application of split thickness skin graft to the left breast (4 x 3 cm)      2) Application of wound vacuum device     SURGEON:  Lana Weinberg MD    ASSISTANT: Teodora Raman ()     ANESTHESIA:  General     ESTIMATED BLOOD LOSS:  Minimal     DRAINS:  None     SPECIMENS: None     OPERATIVE INDICATIONS:  This is a 54 y.o. female who presented to the office in consultation for a left breast cancer. She underwent left oncoplastic reduction with matching right reduction approximately 2 months ago. She developed epidermolysis that progressed along the vertical component. Given that she needs radiation treatment, she is brought to the operating room for coverage with a skin graft. A thorough discussion regarding the risks, benefits, alternatives, outcomes, and personnel involved was performed with the patient.  After all questions were answered to the patient's satisfaction, they agreed to proceed.    OPERATIVE PROCEDURE:  The patient was marked in the preoperative holding area and then brought to the operating room and placed in the supine position on the operating room table. She underwent LMA anesthesia and was prepped and draped in the usual sterile manner.  A time-out was performed confirming the patient and the procedure to be performed.  The operation commenced by preparing the wound with a 15 blade and copiously irrigated with 3L of saline solution using a Pulse Lavage.  Attention was then directed to the left upper lateral thigh.  A split thickness skin graft was harvested with a Emeterio dermatome.  The graft was then meshed in a 1.5:1 manner and sutured into position using 4-0 Chromic sutures.  The donor site had hemostasis  obtained with epinephrine soaked Telfa.  Postoperative analgesia was provided with Exparel.  The donor site was then dressed with a Xeroform that was sutured into position followed by a Tegaderm.  The graft was bolstered with a wound vac that was protected with Adaptic.  There was an excellent seal on the vac.     The patient was then awakened and taken to the PACU in stable condition. There were no immediate complications and the patient tolerated the procedure well. At the end of the case, all counts were correct.    FRANSISCO PRATT MD

## 2024-03-22 NOTE — ANESTHESIA PRE PROCEDURE
Department of Anesthesiology  Preprocedure Note       Name:  Rupinder aYo   Age:  54 y.o.  :  1970                                          MRN:  [id number]         Date:  3/22/2024      Surgeon: Surgeon(s):  Lana Weinberg MD    Procedure: Procedure(s):  SPLIT THICKNESS SKIN GRAFT TO THE LEFT BREAST    Medications prior to admission:   Prior to Admission medications    Medication Sig Start Date End Date Taking? Authorizing Provider   pregabalin (LYRICA) 200 MG capsule Take 1 capsule by mouth nightly. 3/8/24  Yes Arya Alejo MD   nabumetone (RELAFEN) 750 MG tablet Take 1 tablet by mouth 2 times daily    Arya Alejo MD   dupilumab (DUPIXENT) 300 MG/2ML SOPN injection Inject 2 mLs into the skin once EVERY 2 WEEKS    Arya Alejo MD   tiZANidine (ZANAFLEX) 4 MG tablet Take 1 tablet by mouth every 6 hours as needed    Arya Alejo MD   albuterol sulfate HFA (PROVENTIL;VENTOLIN;PROAIR) 108 (90 Base) MCG/ACT inhaler Inhale 2 puffs into the lungs every 6 hours as needed for Wheezing    Arya Alejo MD   albuterol (PROVENTIL) (2.5 MG/3ML) 0.083% nebulizer solution Take 3 mLs by nebulization every 6 hours as needed for Wheezing    Arya Alejo MD   pregabalin (LYRICA) 150 MG capsule Take 1 capsule by mouth 2 times daily.    Arya Alejo MD   cetirizine (ZYRTEC) 10 MG tablet Take 1 tablet by mouth daily    Arya Alejo MD   montelukast (SINGULAIR) 10 MG tablet Take 1 tablet by mouth nightly    Arya Alejo MD   torsemide (DEMADEX) 20 MG tablet Take 1 tablet by mouth daily PRN    Arya Alejo MD       Current medications:    No current facility-administered medications for this encounter.       Allergies:    Allergies   Allergen Reactions   • Gabapentin      FULL BODY SWELLING   • Oxycodone Hives and Itching       Problem List:    Patient Active Problem List   Diagnosis Code   • Wound of left breast S21.002A       Past

## 2024-03-22 NOTE — PROGRESS NOTES
Ambulatory Surgery/Procedure Discharge Note    Vitals:    03/22/24 1626   BP: 114/60   Pulse: 92   Resp: 14   Temp: 97.8 °F (36.6 °C)   SpO2: 96%       In: 1969 [P.O.:720; I.V.:1200]  Out: -     Restroom use offered before discharge.  Yes    Pain assessment:  level of pain (1-10, 10 severe),   Pain Level: 4    Patient discharged to home with family.     Patient discharged to home/self care. Patient discharged via wheel chair by transporter to waiting family/S.O.       3/22/2024 4:57 PM

## 2024-03-22 NOTE — DISCHARGE INSTRUCTIONS
[facility]    Orlando Weinberg MD  [address] ([address])  [place] [address] [phone]    Post-op Instructions  ___________________________________________    Wound Vacuum Device    The following instructions will help you know what to expect in the days following your surgery. Do not, however, hesitate to call if you have questions or concerns.    Activities    - Sleep in a manner that ensures that you do not apply pressure to the area with the vac dressing.  Also be careful not to sleep on the cord that connects the vac dressing to the home machine as this can cause pressure problems on your skin.    - Driving is prohibited for 1 to 2 weeks. Do not drive while taking pain medications.   - Avoid heavy lifting (no more than 5 pounds) and vigorous activity for the first 3 weeks.   - Do not engage in sports, aerobics, or vacuuming.   - Smoking (or ANY nicotine containing product) is prohibited for a minimum of 6 weeks as it interferes with healing and can lead to significant - and avoidable - complications    Diet  - Resume your preoperative diet as tolerated.     Pain Control/Medications  - You will receive a prescription for pain medication that can be taken as directed if needed for pain control. The pain medication may cause constipation and does impair your ability to drive or make important decisions.          - You may also be given a muscle relaxant that can help with muscle spasms. Do                  not take the pain medication and muscle relaxant at the same time        - There is absolutely NO driving while on pain medication or Valium® or                        Flexoril® .        - Additionally do NOT take any of the following products:    Aspirin/low-dose aspirin    Ibuprofen (Advil®, Motrin®    Naprosyn or Naproxen (Aleve®)    Vitamin E (even small amounts in multiple vitamins)    Herbals or homeopathic medicines or green tea    Growth hormone    Diet pills  HOSPITAL AMBULATORY PROCEDURE DISCHARGE INSTRUCTIONS    There are potential side effects of anesthesia or sedation you may experience for the first 24 hours.  These side effects include:    Confusion or Memory loss, Dizziness, or Delayed Reaction Times   [x]A responsible person should be with you for the next 24 hours.  Do not operate any vehicles (automobiles, bicycles, motorcycles) or power tools or machinery for 24 hours.  Do not sign any legal documents or make any legal decisions for 24 hours. Do not drink alcohol for 24 hours or while taking narcotic pain medication.      Nausea    [x]Start with light diet and progress to your normal diet as you feel like eating. However, if you experience nausea or repeated episodes of vomiting which persist beyond 12-24 hours, notify your physician.  Once nausea has passed, remember to keep drinking fluids.    Difficulty Passing Urine  [x]Drink extra amounts of fluid today.  Notify your physician if you have not urinated within 8 hours after your procedure or you feel uncomfortable.      Irritated Throat from a Breathing Tube  [x]Drink extra amounts of fluid today.  Lozenges may help.    Muscle Aches  [x]You may experience some generalized body aches as your muscles recover from medications used to relax them during surgery.  These will gradually subside.    MEDICATION INSTRUCTIONS:  [x]Prescription(S) x 2 sent with you.  Use as directed.  When taking pain medications, you may experience the side effect of dizziness or drowsiness.  Do not drink alcohol or drive when taking these medications.    [x]Give the list of your medications to your primary care physician on your next visit. Keep your med list updated and carry it with in case of emergencies.    [x] Narcotic pain medications can cause the side effect of significant constipation.  You may want to add a stool softener to your postoperative medication schedule or speak to your surgeon on how best to manage this side

## 2024-03-22 NOTE — ANESTHESIA PRE PROCEDURE
Department of Anesthesiology  Preprocedure Note       Name:  Rupinder Yao   Age:  54 y.o.  :  1970                                          MRN:  [id number]         Date:  3/22/2024      Surgeon: Surgeon(s):  Lana Weinberg MD    Procedure: Procedure(s):  SPLIT THICKNESS SKIN GRAFT TO THE LEFT BREAST    Medications prior to admission:   Prior to Admission medications    Medication Sig Start Date End Date Taking? Authorizing Provider   pregabalin (LYRICA) 200 MG capsule Take 1 capsule by mouth nightly. 3/8/24  Yes Arya Alejo MD   nabumetone (RELAFEN) 750 MG tablet Take 1 tablet by mouth 2 times daily    Arya Alejo MD   dupilumab (DUPIXENT) 300 MG/2ML SOPN injection Inject 2 mLs into the skin once EVERY 2 WEEKS    Arya Alejo MD   tiZANidine (ZANAFLEX) 4 MG tablet Take 1 tablet by mouth every 6 hours as needed    Arya Alejo MD   albuterol sulfate HFA (PROVENTIL;VENTOLIN;PROAIR) 108 (90 Base) MCG/ACT inhaler Inhale 2 puffs into the lungs every 6 hours as needed for Wheezing    Arya Alejo MD   albuterol (PROVENTIL) (2.5 MG/3ML) 0.083% nebulizer solution Take 3 mLs by nebulization every 6 hours as needed for Wheezing    Arya Alejo MD   pregabalin (LYRICA) 150 MG capsule Take 1 capsule by mouth 2 times daily.    Arya Alejo MD   cetirizine (ZYRTEC) 10 MG tablet Take 1 tablet by mouth daily    Arya Alejo MD   montelukast (SINGULAIR) 10 MG tablet Take 1 tablet by mouth nightly    Arya Alejo MD   torsemide (DEMADEX) 20 MG tablet Take 1 tablet by mouth daily PRN    Arya Alejo MD       Current medications:    No current facility-administered medications for this encounter.       Allergies:    Allergies   Allergen Reactions    Gabapentin      FULL BODY SWELLING    Oxycodone Hives and Itching       Problem List:    Patient Active Problem List   Diagnosis Code    Wound of left breast S21.002A       Past

## 2024-03-22 NOTE — H&P
Update History & Physical    The patient's History and Physical of March 19, 2024 was reviewed with the patient and I examined the patient. There was no change. The surgical site was confirmed by the patient and me.       Plan: The risks, benefits, expected outcome, and alternative to the recommended procedure have been discussed with the patient. Patient understands and wants to proceed with the procedure.     Electronically signed by Lana Weinberg MD on 3/22/2024 at 2:27 PM

## 2024-03-22 NOTE — PROGRESS NOTES
Pt's friend, Adrienne, updated and informed of her prescriptions that were sent to inpatient/[facility] pharmacy. Adrienne stated she would be able to pick them up for pt

## 2024-04-01 ENCOUNTER — OFFICE VISIT (OUTPATIENT)
Dept: SURGERY | Age: 54
End: 2024-04-01

## 2024-04-01 VITALS
OXYGEN SATURATION: 96 % | HEART RATE: 99 BPM | TEMPERATURE: 98.8 F | RESPIRATION RATE: 16 BRPM | WEIGHT: 206.2 LBS | BODY MASS INDEX: 35.39 KG/M2 | DIASTOLIC BLOOD PRESSURE: 92 MMHG | SYSTOLIC BLOOD PRESSURE: 147 MMHG

## 2024-04-01 DIAGNOSIS — G89.18 POST-OP PAIN: Primary | ICD-10-CM

## 2024-04-01 PROCEDURE — 99024 POSTOP FOLLOW-UP VISIT: CPT

## 2024-04-01 RX ORDER — DIAZEPAM 5 MG/1
5 TABLET ORAL EVERY 12 HOURS PRN
Qty: 15 TABLET | Refills: 0 | Status: SHIPPED | OUTPATIENT
Start: 2024-04-01 | End: 2024-04-11

## 2024-04-01 RX ORDER — TRAMADOL HYDROCHLORIDE 50 MG/1
50 TABLET ORAL EVERY 6 HOURS PRN
Qty: 12 TABLET | Refills: 0 | Status: SHIPPED | OUTPATIENT
Start: 2024-04-01 | End: 2024-04-04

## 2024-04-01 NOTE — PROGRESS NOTES
[facility]    PROCEDURE: 1) Application of split thickness skin graft to the left breast (4 x 3 cm)  2) Application of wound vacuum device  DATE: 3/22/24    Rupinder Yao has been recovering satisfactorily since her procedure. Pain has been poorly controlled with the prescribed pain management regimen. She is requesting additional medications for pain management.     EXAM    BP (!) 147/92 (Site: Right Upper Arm, Position: Sitting, Cuff Size: Medium Adult)   Pulse 99   Temp 98.8 °F (37.1 °C) (Infrared)   Resp 16   Wt 93.5 kg (206 lb 3.2 oz)   LMP 02/15/2021   SpO2 96%   BMI 35.39 kg/m²       GEN: NAD   BREAST: Incision site healing appropriately. Skin graft has taken 100 %.   EXT: Donor site healing appropriately.     IMP: 54 y.o.female s/p STSG to the left breast  PLAN: We will send small script to the pharmacy for pain management. Wound vac removed. She will follow up in 2 weeks to ensure continued wound healing.     Alina Little, APRN-CNP  [facility]  [phone]  04/01/24

## 2024-04-15 ENCOUNTER — OFFICE VISIT (OUTPATIENT)
Dept: SURGERY | Age: 54
End: 2024-04-15

## 2024-04-15 VITALS
DIASTOLIC BLOOD PRESSURE: 80 MMHG | TEMPERATURE: 98 F | HEIGHT: 64 IN | WEIGHT: 205 LBS | HEART RATE: 116 BPM | BODY MASS INDEX: 35 KG/M2 | SYSTOLIC BLOOD PRESSURE: 134 MMHG

## 2024-04-15 DIAGNOSIS — Z09 POSTOP CHECK: Primary | ICD-10-CM

## 2024-04-15 PROCEDURE — 99024 POSTOP FOLLOW-UP VISIT: CPT

## 2024-04-15 NOTE — PROGRESS NOTES
[facility]    PROCEDURE: 1) Application of split thickness skin graft to the left breast (4 x 3 cm)  2) Application of wound vacuum device  DATE: 3/22/24    Rupinder Yao has been recovering well since her procedure. Her pain has been well controlled without pain medication. She has been using bacitracin and xeroform daily.     EXAM  /80 (Site: Right Upper Arm)   Pulse (!) 116   Temp 98 °F (36.7 °C) (Temporal)   Ht 1.626 m (5' 4\")   Wt 93 kg (205 lb)   LMP 02/15/2021   BMI 35.19 kg/m²       GEN: NAD   BREAST: Incision site healed appropriately. Skin graft has taken 100 %.   EXT: Donor site healing appropriately.     IMP: 54 y.o.female s/p STSG to the left breast  PLAN: Overall doing better. Her skin graft has healed well she is cleared from our aspect to move forward with radiation therapy. She will follow up with us 6 months after completion. She will call with any issues in the interim.     Alina Little, APRN-CNP  [facility] Plastic & Reconstructive Surgery  [phone]  4/15/24

## 2024-11-06 ENCOUNTER — HOSPITAL ENCOUNTER (OUTPATIENT)
Dept: WOMENS IMAGING | Age: 54
Discharge: HOME | End: 2024-11-06
Payer: COMMERCIAL

## 2024-11-06 ENCOUNTER — HOSPITAL ENCOUNTER (OUTPATIENT)
Dept: WOMENS IMAGING | Age: 54
End: 2024-11-06
Payer: COMMERCIAL

## 2024-11-06 VITALS — BODY MASS INDEX: 33.46 KG/M2 | WEIGHT: 196 LBS | HEIGHT: 64 IN

## 2024-11-06 DIAGNOSIS — Z85.3 PERSONAL HISTORY OF MALIGNANT NEOPLASM OF BREAST: ICD-10-CM

## 2024-11-06 PROCEDURE — G0279 TOMOSYNTHESIS, MAMMO: HCPCS

## 2025-05-08 ENCOUNTER — HOSPITAL ENCOUNTER (OUTPATIENT)
Dept: MRI IMAGING | Age: 55
Discharge: HOME | End: 2025-05-08
Attending: SURGERY
Payer: COMMERCIAL

## 2025-05-08 DIAGNOSIS — Z85.3 PERSONAL HISTORY OF BREAST CANCER: ICD-10-CM

## 2025-05-08 PROCEDURE — C8908 MRI W/O FOL W/CONT, BREAST,: HCPCS

## 2025-05-08 PROCEDURE — 6360000004 HC RX CONTRAST MEDICATION: Performed by: SURGERY

## 2025-05-08 PROCEDURE — A9579 GAD-BASE MR CONTRAST NOS,1ML: HCPCS | Performed by: SURGERY

## 2025-05-08 RX ADMIN — GADOTERIDOL 17 ML: 279.3 INJECTION, SOLUTION INTRAVENOUS at 14:04

## (undated) DEVICE — SHEET,DRAPE,40X58,STERILE: Brand: MEDLINE

## (undated) DEVICE — DERMATOME BLADES: Brand: DERMATOME

## (undated) DEVICE — SUTURE ETHLN SZ 2-0 L18IN NONABSORBABLE BLK L19MM PS-2 PRIM 593H

## (undated) DEVICE — GLOVE SURG SZ 6.5 BRN LTX FRE SYNTH POLYCHLOROPRENE POLYMER

## (undated) DEVICE — SUTURE VCRL + SZ 3-0 L18IN ABSRB UD SH 1/2 CIR TAPERCUT NDL VCP864D

## (undated) DEVICE — AGENT HEMSTAT 3GM OXIDIZED REGENERATED CELOS ABSRB FOR CONT (ORDER MULTIPLES OF 5EA)

## (undated) DEVICE — 3M™ IOBAN™ 2 ANTIMICROBIAL INCISE DRAPE 6648EZ: Brand: IOBAN™ 2

## (undated) DEVICE — SUTURE ETHLN SZ 3-0 L18IN NONABSORBABLE BLK FS-1 L24MM 3/8 663H

## (undated) DEVICE — NEEDLE,22GX1.5",REG,BEVEL: Brand: MEDLINE

## (undated) DEVICE — SUTURE MCRYL SZ 3-0 L27IN ABSRB UD PS-2 3/8 CIR REV CUT NDL MCP427H

## (undated) DEVICE — SYRINGE IRRIG 60ML SFT PLIABLE BLB EZ TO GRP 1 HND USE W/

## (undated) DEVICE — SYSTEM WND THER 14 DAY 150 CC CANSTR THER UNIT PREVENA + 125

## (undated) DEVICE — YANKAUER,OPEN TIP,W/O VENT,STERILE: Brand: MEDLINE INDUSTRIES, INC.

## (undated) DEVICE — MINOR BREAST: Brand: MEDLINE INDUSTRIES, INC.

## (undated) DEVICE — SUTURE STRATAFIX SPRL MCRYL + SZ 3-0 L18IN ABSRB UD PS-2 SXMP1B107

## (undated) DEVICE — INTENDED USE FOR SURGICAL MARKING ON INTACT SKIN, ALSO PROVIDES A PERMANENT METHOD OF IDENTIFYING OBJECTS IN THE OPERATING ROOM: Brand: WRITESITE® PLUS MINI PREP RESISTANT MARKER

## (undated) DEVICE — GLOVE SURG SZ 7 CRM LTX FREE POLYISOPRENE POLYMER BEAD ANTI

## (undated) DEVICE — HIGH FLOW TIP

## (undated) DEVICE — PLASTIC MAJOR: Brand: MEDLINE INDUSTRIES, INC.

## (undated) DEVICE — 1010 S-DRAPE TOWEL DRAPE 10/BX: Brand: STERI-DRAPE™

## (undated) DEVICE — SPONGE,LAP,18"X18",DLX,XR,ST,5/PK,40/PK: Brand: MEDLINE

## (undated) DEVICE — LIQUIBAND RAPID ADHESIVE 36/CS 0.8ML: Brand: MEDLINE

## (undated) DEVICE — Device

## (undated) DEVICE — HANDPIECE SUCTION TUBING INTERPULSE 10FT

## (undated) DEVICE — SYRINGE MED 30ML STD CLR PLAS LUERLOCK TIP N CTRL DISP

## (undated) DEVICE — GLOVE ORANGE PI 7 1/2   MSG9075

## (undated) DEVICE — SUTURE STRATAFIX SPRL PDS + SZ 2 0 L12IN ABSRB VLT SH L26MM SXPP1B416

## (undated) DEVICE — ADHESIVE SKIN CLOSURE XL 42 CM 2.7 CC MESH LIQUIBAND SECUR

## (undated) DEVICE — SUTURE PERMA-HAND 0 L18IN NONABSORBABLE BLK CT-1 L36MM 1/2 C021D

## (undated) DEVICE — SUTURE CHROMIC GUT SZ 4-0 L27IN ABSRB BRN L17MM RB-1 1/2 U203H

## (undated) DEVICE — SUTURE PERMA-HAND SZ 2-0 L30IN NONABSORBABLE BLK L26MM SH K833H

## (undated) DEVICE — 3M™ TEGADERM™ TRANSPARENT FILM DRESSING FRAME STYLE, 1629, 8 IN X 12 IN (20 CM X 30 CM), 10/CT 8CT/CASE: Brand: 3M™ TEGADERM™

## (undated) DEVICE — TOWEL,OR,DSP,ST,BLUE,DLX,8/PK,10PK/CS: Brand: MEDLINE

## (undated) DEVICE — SEALANT TISS 10 CC FOR HUM FIBRIN VISTASEAL

## (undated) DEVICE — PACK,UNIVERSAL,NO GOWNS: Brand: MEDLINE

## (undated) DEVICE — PAD FOAM 11.75X7-7/8 IN RESTON LF

## (undated) DEVICE — PREMIUM WET SKIN PREP TRAY: Brand: MEDLINE INDUSTRIES, INC.

## (undated) DEVICE — DRAPE,UTILTY,TAPE,15X26, 4EA/PK: Brand: MEDLINE

## (undated) DEVICE — APPLICATOR MEDICATED 26 CC SOLUTION HI LT ORNG CHLORAPREP

## (undated) DEVICE — GLOVE ORANGE PI 8 1/2   MSG9085

## (undated) DEVICE — TOWEL,STOP FLAG GOLD N-W: Brand: MEDLINE

## (undated) DEVICE — GAUZE FLUFF 1 PLY: Brand: DEROYAL

## (undated) DEVICE — 3M™ TEGADERM™ TRANSPARENT FILM DRESSING FRAME STYLE, 1628, 6 IN X 8 IN (15 CM X 20 CM), 10/CT 8CT/CASE: Brand: 3M™ TEGADERM™

## (undated) DEVICE — 3M™ TEGADERM™ CHG CHLORHEXIDINE GLUCONATE GEL PAD 1664, 25 EACH/CARTON, 4 CARTONS/CASE: Brand: 3M™ TEGADERM™

## (undated) DEVICE — PROBE SET W/DRAPE

## (undated) DEVICE — PLASMABLADE PS210-030S 3.0S LOCK: Brand: PLASMABLADE™

## (undated) DEVICE — BLANKET WRM W40.2XL55.9IN IORT LO BODY + MISTRAL AIR

## (undated) DEVICE — 3M™ STERI-STRIP™ BLEND TONE SKIN CLOSURES, B1557, TAN, 1/2 IN X 4 IN (12MM X 100MM), 6 STRIPS/ENVELOPE: Brand: 3M™ STERI-STRIP™

## (undated) DEVICE — STAPLER SKIN H3.9MM WIRE DIA0.58MM CRWN 6.9MM 35 STPL ROT

## (undated) DEVICE — SYRINGE MED 10ML LUERLOCK TIP W/O SFTY DISP

## (undated) DEVICE — NEGATIVE PRESSURE THERAPY KIT 24X22 CM PREVENA RESTOR FRM

## (undated) DEVICE — GLOVE SURG SZ 65 THK91MIL LTX FREE SYN POLYISOPRENE

## (undated) DEVICE — TUBING, SUCTION, 1/4" X 12', STRAIGHT: Brand: MEDLINE

## (undated) DEVICE — PAD,NON-ADHERENT,3X8,STERILE,LF,1/PK: Brand: MEDLINE

## (undated) DEVICE — PENCIL SMK EVAC TELSCP 3 M TBNG

## (undated) DEVICE — DRAIN SURG 15FR L3 16IN DIA47MM SIL RND HUBLESS FULL FLUT